# Patient Record
Sex: FEMALE | Race: BLACK OR AFRICAN AMERICAN | NOT HISPANIC OR LATINO | Employment: FULL TIME | RURAL
[De-identification: names, ages, dates, MRNs, and addresses within clinical notes are randomized per-mention and may not be internally consistent; named-entity substitution may affect disease eponyms.]

---

## 2017-05-09 ENCOUNTER — HISTORICAL (OUTPATIENT)
Dept: ADMINISTRATIVE | Facility: HOSPITAL | Age: 32
End: 2017-05-09

## 2017-05-11 LAB
LAB AP CLINICAL INFORMATION: NORMAL
LAB AP COMMENTS: NORMAL
LAB AP GENERAL CAT - HISTORICAL: NORMAL
LAB AP INTERPRETATION/RESULT - HISTORICAL: NEGATIVE
LAB AP SPECIMEN ADEQUACY - HISTORICAL: NORMAL
LAB AP SPECIMEN SUBMITTED - HISTORICAL: NORMAL

## 2017-11-21 ENCOUNTER — HISTORICAL (OUTPATIENT)
Dept: ADMINISTRATIVE | Facility: HOSPITAL | Age: 32
End: 2017-11-21

## 2017-11-24 LAB
LAB AP CLINICAL INFORMATION: NORMAL
LAB AP DIAGNOSIS - HISTORICAL: NORMAL
LAB AP GROSS PATHOLOGY - HISTORICAL: NORMAL
LAB AP SPECIMEN SUBMITTED - HISTORICAL: NORMAL

## 2018-01-04 ENCOUNTER — HISTORICAL (OUTPATIENT)
Dept: ADMINISTRATIVE | Facility: HOSPITAL | Age: 33
End: 2018-01-04

## 2018-01-08 LAB
LAB AP CLINICAL INFORMATION: NORMAL
LAB AP GENERAL CAT - HISTORICAL: NORMAL
LAB AP INTERPRETATION/RESULT - HISTORICAL: NEGATIVE
LAB AP SPECIMEN ADEQUACY - HISTORICAL: NORMAL
LAB AP SPECIMEN SUBMITTED - HISTORICAL: NORMAL

## 2019-06-25 ENCOUNTER — HISTORICAL (OUTPATIENT)
Dept: ADMINISTRATIVE | Facility: HOSPITAL | Age: 34
End: 2019-06-25

## 2021-05-05 ENCOUNTER — OFFICE VISIT (OUTPATIENT)
Dept: FAMILY MEDICINE | Facility: CLINIC | Age: 36
End: 2021-05-05
Payer: COMMERCIAL

## 2021-05-05 VITALS
TEMPERATURE: 99 F | SYSTOLIC BLOOD PRESSURE: 160 MMHG | OXYGEN SATURATION: 100 % | DIASTOLIC BLOOD PRESSURE: 90 MMHG | WEIGHT: 293 LBS | RESPIRATION RATE: 16 BRPM | HEIGHT: 66 IN | BODY MASS INDEX: 47.09 KG/M2 | HEART RATE: 66 BPM

## 2021-05-05 DIAGNOSIS — J01.00 ACUTE NON-RECURRENT MAXILLARY SINUSITIS: ICD-10-CM

## 2021-05-05 DIAGNOSIS — Z00.00 WELLNESS EXAMINATION: Primary | ICD-10-CM

## 2021-05-05 LAB
ALBUMIN SERPL BCP-MCNC: 3.6 G/DL (ref 3.5–5)
ALBUMIN/GLOB SERPL: 0.9 {RATIO}
ALP SERPL-CCNC: 104 U/L (ref 37–98)
ALT SERPL W P-5'-P-CCNC: 18 U/L (ref 13–56)
ANION GAP SERPL CALCULATED.3IONS-SCNC: 11 MMOL/L (ref 7–16)
AST SERPL W P-5'-P-CCNC: 9 U/L (ref 15–37)
BACTERIA #/AREA URNS HPF: ABNORMAL /HPF
BASOPHILS # BLD AUTO: 0.09 K/UL (ref 0–0.2)
BASOPHILS NFR BLD AUTO: 0.8 % (ref 0–1)
BILIRUB SERPL-MCNC: 0.4 MG/DL (ref 0–1.2)
BILIRUB UR QL STRIP: NEGATIVE
BUN SERPL-MCNC: 15 MG/DL (ref 7–18)
BUN/CREAT SERPL: 21 (ref 6–20)
CALCIUM SERPL-MCNC: 8.6 MG/DL (ref 8.5–10.1)
CHLORIDE SERPL-SCNC: 106 MMOL/L (ref 98–107)
CHOLEST SERPL-MCNC: 126 MG/DL (ref 0–200)
CHOLEST/HDLC SERPL: 2.9 {RATIO}
CLARITY UR: CLEAR
CO2 SERPL-SCNC: 27 MMOL/L (ref 21–32)
COLOR UR: YELLOW
CREAT SERPL-MCNC: 0.72 MG/DL (ref 0.55–1.02)
DIFFERENTIAL METHOD BLD: ABNORMAL
EOSINOPHIL # BLD AUTO: 0.14 K/UL (ref 0–0.5)
EOSINOPHIL NFR BLD AUTO: 1.2 % (ref 1–4)
ERYTHROCYTE [DISTWIDTH] IN BLOOD BY AUTOMATED COUNT: 17.5 % (ref 11.5–14.5)
GLOBULIN SER-MCNC: 4 G/DL (ref 2–4)
GLUCOSE SERPL-MCNC: 75 MG/DL (ref 74–106)
GLUCOSE UR STRIP-MCNC: NEGATIVE MG/DL
HCT VFR BLD AUTO: 34.1 % (ref 38–47)
HDLC SERPL-MCNC: 43 MG/DL (ref 40–60)
HGB BLD-MCNC: 10 G/DL (ref 12–16)
IMM GRANULOCYTES # BLD AUTO: 0.06 K/UL (ref 0–0.04)
IMM GRANULOCYTES NFR BLD: 0.5 % (ref 0–0.4)
KETONES UR STRIP-SCNC: NEGATIVE MG/DL
LDLC SERPL CALC-MCNC: 60 MG/DL
LDLC/HDLC SERPL: 1.4 {RATIO}
LEUKOCYTE ESTERASE UR QL STRIP: ABNORMAL
LYMPHOCYTES # BLD AUTO: 4.59 K/UL (ref 1–4.8)
LYMPHOCYTES NFR BLD AUTO: 40.2 % (ref 27–41)
MCH RBC QN AUTO: 22 PG (ref 27–31)
MCHC RBC AUTO-ENTMCNC: 29.3 G/DL (ref 32–36)
MCV RBC AUTO: 75.1 FL (ref 80–96)
MONOCYTES # BLD AUTO: 0.48 K/UL (ref 0–0.8)
MONOCYTES NFR BLD AUTO: 4.2 % (ref 2–6)
MPC BLD CALC-MCNC: 11.6 FL (ref 9.4–12.4)
MUCOUS THREADS #/AREA URNS HPF: ABNORMAL /HPF
NEUTROPHILS # BLD AUTO: 6.05 K/UL (ref 1.8–7.7)
NEUTROPHILS NFR BLD AUTO: 53.1 % (ref 53–65)
NITRITE UR QL STRIP: NEGATIVE
NONHDLC SERPL-MCNC: 83 MG/DL
NRBC # BLD AUTO: 0 X10E3/UL
NRBC, AUTO (.00): 0 %
NT-PROBNP SERPL-MCNC: 186 PG/ML (ref 1–125)
PH UR STRIP: 6 PH UNITS
PLATELET # BLD AUTO: 314 K/UL (ref 150–400)
POTASSIUM SERPL-SCNC: 3.8 MMOL/L (ref 3.5–5.1)
PROT SERPL-MCNC: 7.6 G/DL (ref 6.4–8.2)
PROT UR QL STRIP: NEGATIVE
RBC # BLD AUTO: 4.54 M/UL (ref 4.2–5.4)
RBC # UR STRIP: ABNORMAL /UL
RBC #/AREA URNS HPF: ABNORMAL /HPF
SODIUM SERPL-SCNC: 140 MMOL/L (ref 136–145)
SP GR UR STRIP: 1.02
SQUAMOUS #/AREA URNS LPF: ABNORMAL /LPF
T4 FREE SERPL-MCNC: 0.95 NG/DL (ref 0.76–1.46)
TRANS CELLS #/AREA URNS LPF: ABNORMAL /LPF
TRIGL SERPL-MCNC: 115 MG/DL (ref 35–150)
TSH SERPL DL<=0.005 MIU/L-ACNC: 2.15 UIU/ML (ref 0.36–3.74)
UROBILINOGEN UR STRIP-ACNC: 0.2 MG/DL
VLDLC SERPL-MCNC: 23 MG/DL
WBC # BLD AUTO: 11.41 K/UL (ref 4.5–11)
WBC #/AREA URNS HPF: ABNORMAL /HPF

## 2021-05-05 PROCEDURE — 99395 PR PREVENTIVE VISIT,EST,18-39: ICD-10-PCS | Mod: 25,,, | Performed by: FAMILY MEDICINE

## 2021-05-05 PROCEDURE — 84439 ASSAY OF FREE THYROXINE: CPT | Mod: ICN,,, | Performed by: CLINICAL MEDICAL LABORATORY

## 2021-05-05 PROCEDURE — 80050 COMPREHENSIVE METABOLIC PANEL: ICD-10-PCS | Mod: ICN,,, | Performed by: CLINICAL MEDICAL LABORATORY

## 2021-05-05 PROCEDURE — 81001 URINALYSIS AUTO W/SCOPE: CPT | Mod: ICN,,, | Performed by: CLINICAL MEDICAL LABORATORY

## 2021-05-05 PROCEDURE — 96372 THER/PROPH/DIAG INJ SC/IM: CPT | Mod: ,,, | Performed by: FAMILY MEDICINE

## 2021-05-05 PROCEDURE — 87077 CULTURE, URINE: ICD-10-PCS | Mod: ICN,,, | Performed by: CLINICAL MEDICAL LABORATORY

## 2021-05-05 PROCEDURE — 87086 URINE CULTURE/COLONY COUNT: CPT | Mod: ICN,,, | Performed by: CLINICAL MEDICAL LABORATORY

## 2021-05-05 PROCEDURE — 84439 T4, FREE: ICD-10-PCS | Mod: ICN,,, | Performed by: CLINICAL MEDICAL LABORATORY

## 2021-05-05 PROCEDURE — 87077 CULTURE AEROBIC IDENTIFY: CPT | Mod: ICN,,, | Performed by: CLINICAL MEDICAL LABORATORY

## 2021-05-05 PROCEDURE — 87086 CULTURE, URINE: ICD-10-PCS | Mod: ICN,,, | Performed by: CLINICAL MEDICAL LABORATORY

## 2021-05-05 PROCEDURE — 83880 ASSAY OF NATRIURETIC PEPTIDE: CPT | Mod: ICN,,, | Performed by: CLINICAL MEDICAL LABORATORY

## 2021-05-05 PROCEDURE — 80050 GENERAL HEALTH PANEL: CPT | Mod: ICN,,, | Performed by: CLINICAL MEDICAL LABORATORY

## 2021-05-05 PROCEDURE — 80061 LIPID PANEL: CPT | Mod: ICN,,, | Performed by: CLINICAL MEDICAL LABORATORY

## 2021-05-05 PROCEDURE — 82306 VITAMIN D: ICD-10-PCS | Mod: ICN,,, | Performed by: CLINICAL MEDICAL LABORATORY

## 2021-05-05 PROCEDURE — 82306 VITAMIN D 25 HYDROXY: CPT | Mod: ICN,,, | Performed by: CLINICAL MEDICAL LABORATORY

## 2021-05-05 PROCEDURE — 96372 PR INJECTION,THERAP/PROPH/DIAG2ST, IM OR SUBCUT: ICD-10-PCS | Mod: ,,, | Performed by: FAMILY MEDICINE

## 2021-05-05 PROCEDURE — 80061 LIPID PANEL: ICD-10-PCS | Mod: ICN,,, | Performed by: CLINICAL MEDICAL LABORATORY

## 2021-05-05 PROCEDURE — 83880 NT-PRO NATRIURETIC PEPTIDE: ICD-10-PCS | Mod: ICN,,, | Performed by: CLINICAL MEDICAL LABORATORY

## 2021-05-05 PROCEDURE — 81001 URINALYSIS, MICROSCOPIC: ICD-10-PCS | Mod: ICN,,, | Performed by: CLINICAL MEDICAL LABORATORY

## 2021-05-05 PROCEDURE — 99395 PREV VISIT EST AGE 18-39: CPT | Mod: 25,,, | Performed by: FAMILY MEDICINE

## 2021-05-05 RX ORDER — PHENTERMINE HYDROCHLORIDE 37.5 MG/1
37.5 TABLET ORAL DAILY
COMMUNITY
Start: 2021-01-28 | End: 2023-07-04

## 2021-05-05 RX ORDER — AMOXICILLIN AND CLAVULANATE POTASSIUM 875; 125 MG/1; MG/1
1 TABLET, FILM COATED ORAL 2 TIMES DAILY
Qty: 20 TABLET | Refills: 0 | Status: SHIPPED | OUTPATIENT
Start: 2021-05-05 | End: 2021-05-15

## 2021-05-05 RX ORDER — CEFTRIAXONE 1 G/1
1 INJECTION, POWDER, FOR SOLUTION INTRAMUSCULAR; INTRAVENOUS
Status: COMPLETED | OUTPATIENT
Start: 2021-05-05 | End: 2021-05-05

## 2021-05-05 RX ADMIN — CEFTRIAXONE 1 G: 1 INJECTION, POWDER, FOR SOLUTION INTRAMUSCULAR; INTRAVENOUS at 01:05

## 2021-05-06 LAB — 25(OH)D3 SERPL-MCNC: 17.4 NG/ML

## 2021-05-07 LAB — UA COMPLETE W REFLEX CULTURE PNL UR: ABNORMAL

## 2021-05-10 DIAGNOSIS — N39.0 URINARY TRACT INFECTION WITHOUT HEMATURIA, SITE UNSPECIFIED: Primary | ICD-10-CM

## 2021-05-10 DIAGNOSIS — E61.1 IRON DEFICIENCY: ICD-10-CM

## 2021-05-10 DIAGNOSIS — E55.9 VITAMIN D DEFICIENCY: ICD-10-CM

## 2021-05-10 RX ORDER — FERROUS SULFATE 325(65) MG
325 TABLET, DELAYED RELEASE (ENTERIC COATED) ORAL 2 TIMES DAILY
Qty: 60 TABLET | Refills: 0 | Status: SHIPPED | OUTPATIENT
Start: 2021-05-10 | End: 2021-06-09

## 2021-05-10 RX ORDER — NITROFURANTOIN 25; 75 MG/1; MG/1
100 CAPSULE ORAL 2 TIMES DAILY
Qty: 14 CAPSULE | Refills: 0 | Status: SHIPPED | OUTPATIENT
Start: 2021-05-10 | End: 2021-05-17

## 2021-05-10 RX ORDER — CHOLECALCIFEROL (VITAMIN D3) 1250 MCG
1 TABLET ORAL WEEKLY
Qty: 12 TABLET | Refills: 0 | Status: SHIPPED | OUTPATIENT
Start: 2021-05-10 | End: 2021-07-27

## 2021-05-11 DIAGNOSIS — I10 HYPERTENSION, UNSPECIFIED TYPE: Primary | ICD-10-CM

## 2021-06-02 ENCOUNTER — OFFICE VISIT (OUTPATIENT)
Dept: CARDIOLOGY | Facility: CLINIC | Age: 36
End: 2021-06-02
Payer: COMMERCIAL

## 2021-06-02 VITALS
HEIGHT: 66 IN | BODY MASS INDEX: 47.09 KG/M2 | WEIGHT: 293 LBS | SYSTOLIC BLOOD PRESSURE: 126 MMHG | DIASTOLIC BLOOD PRESSURE: 90 MMHG

## 2021-06-02 DIAGNOSIS — I10 HYPERTENSION, UNSPECIFIED TYPE: ICD-10-CM

## 2021-06-02 DIAGNOSIS — R94.31 ABNORMAL ELECTROCARDIOGRAM (ECG) (EKG): ICD-10-CM

## 2021-06-02 DIAGNOSIS — E66.01 MORBID OBESITY: ICD-10-CM

## 2021-06-02 DIAGNOSIS — R60.0 BILATERAL LOWER EXTREMITY EDEMA: Primary | ICD-10-CM

## 2021-06-02 PROCEDURE — 93010 ELECTROCARDIOGRAM REPORT: CPT | Mod: S$PBB,,, | Performed by: INTERNAL MEDICINE

## 2021-06-02 PROCEDURE — 93005 ELECTROCARDIOGRAM TRACING: CPT | Mod: PBBFAC | Performed by: INTERNAL MEDICINE

## 2021-06-02 PROCEDURE — 99214 OFFICE O/P EST MOD 30 MIN: CPT | Mod: PBBFAC,25 | Performed by: INTERNAL MEDICINE

## 2021-06-02 PROCEDURE — 93010 EKG 12-LEAD: ICD-10-PCS | Mod: S$PBB,,, | Performed by: INTERNAL MEDICINE

## 2021-06-02 PROCEDURE — 99204 OFFICE O/P NEW MOD 45 MIN: CPT | Mod: S$PBB,,, | Performed by: INTERNAL MEDICINE

## 2021-06-02 PROCEDURE — 99204 PR OFFICE/OUTPT VISIT, NEW, LEVL IV, 45-59 MIN: ICD-10-PCS | Mod: S$PBB,,, | Performed by: INTERNAL MEDICINE

## 2021-06-18 ENCOUNTER — HOSPITAL ENCOUNTER (OUTPATIENT)
Dept: CARDIOLOGY | Facility: HOSPITAL | Age: 36
Discharge: HOME | End: 2021-06-18
Attending: INTERNAL MEDICINE
Payer: COMMERCIAL

## 2021-06-18 ENCOUNTER — HOSPITAL ENCOUNTER (OUTPATIENT)
Dept: RADIOLOGY | Facility: HOSPITAL | Age: 36
Discharge: HOME | End: 2021-06-18
Attending: INTERNAL MEDICINE
Payer: COMMERCIAL

## 2021-06-18 DIAGNOSIS — R94.31 NONSPECIFIC ABNORMAL ELECTROCARDIOGRAM (ECG) (EKG): Primary | ICD-10-CM

## 2021-06-18 DIAGNOSIS — R94.31 ABNORMAL ELECTROCARDIOGRAM (ECG) (EKG): ICD-10-CM

## 2021-06-18 DIAGNOSIS — R07.9 CHEST PAIN, UNSPECIFIED TYPE: ICD-10-CM

## 2021-06-18 PROCEDURE — 93306 TTE W/DOPPLER COMPLETE: CPT | Mod: 26,,, | Performed by: INTERNAL MEDICINE

## 2021-06-18 PROCEDURE — 93306 ECHO (CUPID ONLY): ICD-10-PCS | Mod: 26,,, | Performed by: INTERNAL MEDICINE

## 2021-06-18 PROCEDURE — 93306 TTE W/DOPPLER COMPLETE: CPT

## 2021-06-20 LAB
AORTIC VALVE CUSP SEPERATION: 2.17 CM
AV INDEX (PROSTH): 0.64
AV VALVE AREA: 2.43 CM2
CV ECHO LV RWT: 0.53 CM
DOP CALC AO VTI: 30.07 CM
DOP CALC LVOT AREA: 3.8 CM2
DOP CALC LVOT DIAMETER: 2.2 CM
DOP CALC LVOT STROKE VOLUME: 73.14 CM3
DOP CALC MV VTI: 28.96 CM
DOP CALCLVOT PEAK VEL VTI: 19.25 CM
E WAVE DECELERATION TIME: 171 MSEC
ECHO LV POSTERIOR WALL: 1.33 CM (ref 0.6–1.1)
EJECTION FRACTION: 55 %
FRACTIONAL SHORTENING: 28 % (ref 28–44)
HR MV ECHO: 100 BPM
INTERVENTRICULAR SEPTUM: 1.16 CM (ref 0.6–1.1)
IVC DIAMETER: 1.6 CM
LEFT INTERNAL DIMENSION IN SYSTOLE: 3.57 CM (ref 2.1–4)
LEFT VENTRICULAR INTERNAL DIMENSION IN DIASTOLE: 4.98 CM (ref 3.5–6)
LEFT VENTRICULAR MASS: 244.65 G
MV MEAN GRADIENT: 3 MMHG
MV PEAK E VEL: 1.08 M/S
MV PEAK GRADIENT: 6 MMHG
MV STENOSIS PRESSURE HALF TIME: 71 MS
MV VALVE AREA BY CONTINUITY EQUATION: 2.53 CM2
MV VALVE AREA P 1/2 METHOD: 3.1 CM2

## 2021-06-27 PROBLEM — E66.01 MORBID OBESITY: Status: ACTIVE | Noted: 2021-06-27

## 2021-06-27 PROBLEM — R94.31 ABNORMAL ELECTROCARDIOGRAM (ECG) (EKG): Status: ACTIVE | Noted: 2021-06-27

## 2021-06-27 PROBLEM — I10 HYPERTENSION: Status: ACTIVE | Noted: 2021-06-27

## 2021-06-27 PROBLEM — R60.0 BILATERAL LOWER EXTREMITY EDEMA: Status: ACTIVE | Noted: 2021-06-27

## 2021-07-07 ENCOUNTER — HOSPITAL ENCOUNTER (OUTPATIENT)
Dept: CARDIOLOGY | Facility: HOSPITAL | Age: 36
Discharge: HOME | End: 2021-07-07
Attending: INTERNAL MEDICINE
Payer: COMMERCIAL

## 2021-07-07 VITALS
HEART RATE: 74 BPM | BODY MASS INDEX: 46.61 KG/M2 | SYSTOLIC BLOOD PRESSURE: 153 MMHG | DIASTOLIC BLOOD PRESSURE: 75 MMHG | HEIGHT: 66 IN | WEIGHT: 290 LBS

## 2021-07-07 DIAGNOSIS — R94.31 NONSPECIFIC ABNORMAL ELECTROCARDIOGRAM (ECG) (EKG): ICD-10-CM

## 2021-07-07 LAB
CV STRESS BASE HR: 74 BPM
DIASTOLIC BLOOD PRESSURE: 78 MMHG
OHS CV CPX 1 MINUTE RECOVERY HEART RATE: 101 BPM
OHS CV CPX 85 PERCENT MAX PREDICTED HEART RATE MALE: 147
OHS CV CPX ESTIMATED METS: 4
OHS CV CPX MAX PREDICTED HEART RATE: 173
OHS CV CPX PATIENT IS FEMALE: 1
OHS CV CPX PATIENT IS MALE: 0
OHS CV CPX PEAK DIASTOLIC BLOOD PRESSURE: 104 MMHG
OHS CV CPX PEAK HEAR RATE: 141 BPM
OHS CV CPX PEAK RATE PRESSURE PRODUCT: NORMAL
OHS CV CPX PEAK SYSTOLIC BLOOD PRESSURE: 216 MMHG
OHS CV CPX PERCENT MAX PREDICTED HEART RATE ACHIEVED: 82
OHS CV CPX RATE PRESSURE PRODUCT PRESENTING: NORMAL
STRESS ECHO POST EXERCISE DUR MIN: 4 MINUTES
STRESS ECHO POST EXERCISE DUR SEC: 44 SECONDS
SYSTOLIC BLOOD PRESSURE: 153 MMHG

## 2021-07-07 PROCEDURE — 93016 CV STRESS TEST SUPVJ ONLY: CPT | Mod: ,,, | Performed by: NURSE PRACTITIONER

## 2021-07-07 PROCEDURE — 93018 EXERCISE STRESS - EKG (CUPID ONLY): ICD-10-PCS | Mod: ,,, | Performed by: INTERNAL MEDICINE

## 2021-07-07 PROCEDURE — 93018 CV STRESS TEST I&R ONLY: CPT | Mod: ,,, | Performed by: INTERNAL MEDICINE

## 2021-07-07 PROCEDURE — 93016 EXERCISE STRESS - EKG (CUPID ONLY): ICD-10-PCS | Mod: ,,, | Performed by: NURSE PRACTITIONER

## 2021-07-07 PROCEDURE — 93017 CV STRESS TEST TRACING ONLY: CPT

## 2021-07-20 ENCOUNTER — DOCUMENTATION ONLY (OUTPATIENT)
Dept: CARDIOLOGY | Facility: CLINIC | Age: 36
End: 2021-07-20

## 2021-07-20 ENCOUNTER — OFFICE VISIT (OUTPATIENT)
Dept: CARDIOLOGY | Facility: CLINIC | Age: 36
End: 2021-07-20
Payer: COMMERCIAL

## 2021-07-20 VITALS
OXYGEN SATURATION: 96 % | BODY MASS INDEX: 46.61 KG/M2 | SYSTOLIC BLOOD PRESSURE: 124 MMHG | HEIGHT: 66 IN | HEART RATE: 75 BPM | DIASTOLIC BLOOD PRESSURE: 80 MMHG | WEIGHT: 290 LBS

## 2021-07-20 DIAGNOSIS — R60.0 BILATERAL LOWER EXTREMITY EDEMA: Primary | ICD-10-CM

## 2021-07-20 DIAGNOSIS — I10 ESSENTIAL HYPERTENSION: ICD-10-CM

## 2021-07-20 DIAGNOSIS — E66.01 MORBID OBESITY: ICD-10-CM

## 2021-07-20 PROCEDURE — 3008F BODY MASS INDEX DOCD: CPT | Mod: ,,, | Performed by: INTERNAL MEDICINE

## 2021-07-20 PROCEDURE — 1159F MED LIST DOCD IN RCRD: CPT | Mod: ,,, | Performed by: INTERNAL MEDICINE

## 2021-07-20 PROCEDURE — 3079F DIAST BP 80-89 MM HG: CPT | Mod: ,,, | Performed by: INTERNAL MEDICINE

## 2021-07-20 PROCEDURE — 99214 OFFICE O/P EST MOD 30 MIN: CPT | Mod: S$PBB,,, | Performed by: INTERNAL MEDICINE

## 2021-07-20 PROCEDURE — 1160F PR REVIEW ALL MEDS BY PRESCRIBER/CLIN PHARMACIST DOCUMENTED: ICD-10-PCS | Mod: ,,, | Performed by: INTERNAL MEDICINE

## 2021-07-20 PROCEDURE — 3008F PR BODY MASS INDEX (BMI) DOCUMENTED: ICD-10-PCS | Mod: ,,, | Performed by: INTERNAL MEDICINE

## 2021-07-20 PROCEDURE — 99214 PR OFFICE/OUTPT VISIT, EST, LEVL IV, 30-39 MIN: ICD-10-PCS | Mod: S$PBB,,, | Performed by: INTERNAL MEDICINE

## 2021-07-20 PROCEDURE — 3074F SYST BP LT 130 MM HG: CPT | Mod: ,,, | Performed by: INTERNAL MEDICINE

## 2021-07-20 PROCEDURE — 1160F RVW MEDS BY RX/DR IN RCRD: CPT | Mod: ,,, | Performed by: INTERNAL MEDICINE

## 2021-07-20 PROCEDURE — 99214 OFFICE O/P EST MOD 30 MIN: CPT | Mod: PBBFAC | Performed by: INTERNAL MEDICINE

## 2021-07-20 PROCEDURE — 1159F PR MEDICATION LIST DOCUMENTED IN MEDICAL RECORD: ICD-10-PCS | Mod: ,,, | Performed by: INTERNAL MEDICINE

## 2021-07-20 PROCEDURE — 3079F PR MOST RECENT DIASTOLIC BLOOD PRESSURE 80-89 MM HG: ICD-10-PCS | Mod: ,,, | Performed by: INTERNAL MEDICINE

## 2021-07-20 PROCEDURE — 3074F PR MOST RECENT SYSTOLIC BLOOD PRESSURE < 130 MM HG: ICD-10-PCS | Mod: ,,, | Performed by: INTERNAL MEDICINE

## 2021-07-20 RX ORDER — HYDROCHLOROTHIAZIDE 25 MG/1
25 TABLET ORAL DAILY
COMMUNITY
End: 2023-07-04

## 2021-07-26 ENCOUNTER — OFFICE VISIT (OUTPATIENT)
Dept: OBSTETRICS AND GYNECOLOGY | Facility: CLINIC | Age: 36
End: 2021-07-26
Payer: COMMERCIAL

## 2021-07-26 VITALS
BODY MASS INDEX: 47.09 KG/M2 | DIASTOLIC BLOOD PRESSURE: 80 MMHG | SYSTOLIC BLOOD PRESSURE: 124 MMHG | HEIGHT: 66 IN | RESPIRATION RATE: 16 BRPM | WEIGHT: 293 LBS

## 2021-07-26 DIAGNOSIS — Z01.419 ENCOUNTER FOR ANNUAL ROUTINE GYNECOLOGICAL EXAMINATION: Primary | ICD-10-CM

## 2021-07-26 DIAGNOSIS — Z12.4 SCREENING FOR MALIGNANT NEOPLASM OF CERVIX: ICD-10-CM

## 2021-07-26 DIAGNOSIS — Z30.46 SURVEILLANCE OF PREVIOUSLY PRESCRIBED IMPLANTABLE SUBDERMAL CONTRACEPTIVE: ICD-10-CM

## 2021-07-26 PROCEDURE — 3074F SYST BP LT 130 MM HG: CPT | Mod: ,,, | Performed by: OBSTETRICS & GYNECOLOGY

## 2021-07-26 PROCEDURE — 99395 PREV VISIT EST AGE 18-39: CPT | Mod: S$PBB,,, | Performed by: OBSTETRICS & GYNECOLOGY

## 2021-07-26 PROCEDURE — 1160F RVW MEDS BY RX/DR IN RCRD: CPT | Mod: ,,, | Performed by: OBSTETRICS & GYNECOLOGY

## 2021-07-26 PROCEDURE — 1159F PR MEDICATION LIST DOCUMENTED IN MEDICAL RECORD: ICD-10-PCS | Mod: ,,, | Performed by: OBSTETRICS & GYNECOLOGY

## 2021-07-26 PROCEDURE — 3008F BODY MASS INDEX DOCD: CPT | Mod: ,,, | Performed by: OBSTETRICS & GYNECOLOGY

## 2021-07-26 PROCEDURE — 88142 CYTOPATH C/V THIN LAYER: CPT | Mod: GCY | Performed by: OBSTETRICS & GYNECOLOGY

## 2021-07-26 PROCEDURE — 1159F MED LIST DOCD IN RCRD: CPT | Mod: ,,, | Performed by: OBSTETRICS & GYNECOLOGY

## 2021-07-26 PROCEDURE — 3074F PR MOST RECENT SYSTOLIC BLOOD PRESSURE < 130 MM HG: ICD-10-PCS | Mod: ,,, | Performed by: OBSTETRICS & GYNECOLOGY

## 2021-07-26 PROCEDURE — 3008F PR BODY MASS INDEX (BMI) DOCUMENTED: ICD-10-PCS | Mod: ,,, | Performed by: OBSTETRICS & GYNECOLOGY

## 2021-07-26 PROCEDURE — 1160F PR REVIEW ALL MEDS BY PRESCRIBER/CLIN PHARMACIST DOCUMENTED: ICD-10-PCS | Mod: ,,, | Performed by: OBSTETRICS & GYNECOLOGY

## 2021-07-26 PROCEDURE — 3079F DIAST BP 80-89 MM HG: CPT | Mod: ,,, | Performed by: OBSTETRICS & GYNECOLOGY

## 2021-07-26 PROCEDURE — 3079F PR MOST RECENT DIASTOLIC BLOOD PRESSURE 80-89 MM HG: ICD-10-PCS | Mod: ,,, | Performed by: OBSTETRICS & GYNECOLOGY

## 2021-07-26 PROCEDURE — 99213 OFFICE O/P EST LOW 20 MIN: CPT | Mod: PBBFAC | Performed by: OBSTETRICS & GYNECOLOGY

## 2021-07-26 PROCEDURE — 99395 PR PREVENTIVE VISIT,EST,18-39: ICD-10-PCS | Mod: S$PBB,,, | Performed by: OBSTETRICS & GYNECOLOGY

## 2021-07-26 RX ORDER — FERROUS SULFATE 325(65) MG
325 TABLET ORAL DAILY
COMMUNITY
End: 2023-07-04

## 2021-07-28 LAB
GH SERPL-MCNC: NORMAL NG/ML
INSULIN SERPL-ACNC: NORMAL U[IU]/ML
LAB AP CLINICAL INFORMATION: NORMAL
LAB AP GYN INTERPRETATION: NEGATIVE
LAB AP PAP DISCLAIMER COMMENTS: NORMAL
RENIN PLAS-CCNC: NORMAL NG/ML/H

## 2021-09-27 ENCOUNTER — PROCEDURE VISIT (OUTPATIENT)
Dept: OBSTETRICS AND GYNECOLOGY | Facility: CLINIC | Age: 36
End: 2021-09-27
Payer: COMMERCIAL

## 2021-09-27 VITALS
BODY MASS INDEX: 47.09 KG/M2 | HEIGHT: 66 IN | RESPIRATION RATE: 16 BRPM | SYSTOLIC BLOOD PRESSURE: 122 MMHG | WEIGHT: 293 LBS | DIASTOLIC BLOOD PRESSURE: 64 MMHG

## 2021-09-27 DIAGNOSIS — Z30.46 SURVEILLANCE OF PREVIOUSLY PRESCRIBED IMPLANTABLE SUBDERMAL CONTRACEPTIVE: ICD-10-CM

## 2021-09-27 DIAGNOSIS — Z30.017 INSERTION OF NEXPLANON: Primary | ICD-10-CM

## 2021-09-27 LAB
B-HCG UR QL: NEGATIVE
CTP QC/QA: YES

## 2021-09-27 PROCEDURE — 11981 INSERTION DRUG DLVR IMPLANT: CPT | Mod: S$PBB,,, | Performed by: OBSTETRICS & GYNECOLOGY

## 2021-09-27 PROCEDURE — 11981 PR INSERT, DRUG DELIVERY IMPLANT, BIORESORB/BIODEGR/NON-BIODEGR: ICD-10-PCS | Mod: S$PBB,,, | Performed by: OBSTETRICS & GYNECOLOGY

## 2021-09-27 PROCEDURE — 11981 INSERTION DRUG DLVR IMPLANT: CPT | Mod: PBBFAC | Performed by: OBSTETRICS & GYNECOLOGY

## 2021-09-27 PROCEDURE — 81025 URINE PREGNANCY TEST: CPT | Mod: PBBFAC | Performed by: OBSTETRICS & GYNECOLOGY

## 2021-09-27 RX ADMIN — ETONOGESTREL 68 MG: 68 IMPLANT SUBCUTANEOUS at 05:09

## 2022-01-24 NOTE — PROGRESS NOTES
Cardiology Clinic Note:    PCP: Abiodun Diaz DO    REFERRING PHYSICIAN: Abiodun Diaz DO    CHIEF COMPLAINT:   Chief Complaint   Patient presents with    Hypertension    Edema        HISTORY OF PRESENT ILLNESS:  Dimple Reinoso is a 36 y.o. female who presents for evaluation of lower extremity edema        Patient presents for evaluation of lower extremity edema which she states is still present and having some hand swelling.  She completed her course of Lasix.  Has decreased salt intake and fast food.    Pt denies chest pain, tightness, pressure, palpitations or squeezing.   Denies SOB.  Exercise includes walking at work up to 7000 steps without symptoms of chest pain, pressure or shortness of breath.  Patient states she has tried weight loss programs but has not been consistent.     Review of Systems   Constitutional: Negative for diaphoresis, malaise/fatigue, night sweats and weight gain.   HENT: Negative for congestion, ear pain, hearing loss, nosebleeds and sore throat.    Eyes: Negative for blurred vision, double vision, pain, photophobia and visual disturbance.   Cardiovascular: Positive for leg swelling. Negative for chest pain, claudication, dyspnea on exertion, irregular heartbeat, near-syncope, orthopnea, palpitations and syncope.   Respiratory: Negative for cough, shortness of breath, sleep disturbances due to breathing, snoring and wheezing.    Endocrine: Negative for cold intolerance, heat intolerance, polydipsia, polyphagia and polyuria.   Hematologic/Lymphatic: Negative for bleeding problem. Does not bruise/bleed easily.   Skin: Negative for dry skin, flushing, itching, rash and skin cancer.   Musculoskeletal: Negative for arthritis, back pain, falls, joint pain, muscle cramps, muscle weakness and myalgias.   Gastrointestinal: Negative for abdominal pain, change in bowel habit, constipation, diarrhea, dysphagia, heartburn, nausea and vomiting.   Genitourinary: Negative for bladder  "incontinence, dysuria, flank pain, frequency and nocturia.   Neurological: Negative for dizziness, focal weakness, headaches, light-headedness, loss of balance, numbness, paresthesias and seizures.   Psychiatric/Behavioral: Negative for depression, memory loss and substance abuse. The patient is not nervous/anxious.    Allergic/Immunologic: Negative for environmental allergies.          PAST MEDICAL HISTORY:  Past Medical History:   Diagnosis Date    Hypertension        PAST SURGICAL HISTORY:  Past Surgical History:   Procedure Laterality Date     SECTION  2017    CHOLECYSTECTOMY  2004    REDUCTION OF BOTH BREASTS Bilateral        SOCIAL HISTORY:  Social History     Socioeconomic History    Marital status:    Tobacco Use    Smoking status: Never Smoker    Smokeless tobacco: Never Used   Substance and Sexual Activity    Alcohol use: Yes     Comment: glass of wine maybe twice a month     Drug use: Never    Sexual activity: Yes     Partners: Male     Birth control/protection: None       FAMILY HISTORY:  Family History   Problem Relation Age of Onset    Breast cancer Maternal Grandmother     Diabetes Maternal Grandmother        ALLERGIES:  Allergies as of 2022    (No Known Allergies)         MEDICATIONS:  Current Outpatient Medications on File Prior to Visit   Medication Sig Dispense Refill    hydroCHLOROthiazide (HYDRODIURIL) 25 MG tablet Take 25 mg by mouth once daily.      ferrous sulfate (FEOSOL) 325 mg (65 mg iron) Tab tablet Take 325 mg by mouth once daily.      phentermine (ADIPEX-P) 37.5 mg tablet Take 37.5 mg by mouth once daily.       No current facility-administered medications on file prior to visit.          PHYSICAL EXAM:  Blood pressure (!) 140/92, pulse 76, height 5' 6" (1.676 m), weight 135.2 kg (298 lb), SpO2 98 %.  Wt Readings from Last 3 Encounters:   22 135.2 kg (298 lb)   21 (!) 137.2 kg (302 lb 8 oz)   21 (!) 138 kg (304 lb 2 oz)      Body " mass index is 48.1 kg/m².    Physical Exam  Vitals and nursing note reviewed.   Constitutional:       Appearance: Normal appearance. She is obese.   HENT:      Head: Normocephalic and atraumatic.      Right Ear: External ear normal.      Left Ear: External ear normal.   Eyes:      General: No scleral icterus.        Right eye: No discharge.         Left eye: No discharge.      Extraocular Movements: Extraocular movements intact.      Conjunctiva/sclera: Conjunctivae normal.      Pupils: Pupils are equal, round, and reactive to light.   Cardiovascular:      Rate and Rhythm: Normal rate and regular rhythm.      Pulses: Normal pulses.      Heart sounds: Normal heart sounds. No murmur heard.  No friction rub. No gallop.    Pulmonary:      Effort: Pulmonary effort is normal.      Breath sounds: Normal breath sounds. No wheezing, rhonchi or rales.   Chest:      Chest wall: No tenderness.   Abdominal:      General: Abdomen is flat. Bowel sounds are normal. There is no distension.      Palpations: Abdomen is soft.      Tenderness: There is no abdominal tenderness. There is no guarding or rebound.   Musculoskeletal:         General: No swelling or tenderness. Normal range of motion.      Cervical back: Normal range of motion and neck supple.   Skin:     General: Skin is warm and dry.      Findings: No erythema or rash.   Neurological:      General: No focal deficit present.      Mental Status: She is alert and oriented to person, place, and time.      Cranial Nerves: No cranial nerve deficit.      Motor: No weakness.      Gait: Gait normal.   Psychiatric:         Mood and Affect: Mood normal.         Behavior: Behavior normal.         Thought Content: Thought content normal.         Judgment: Judgment normal.          LABS REVIEWED:  Lab Results   Component Value Date    WBC 11.41 (H) 05/05/2021    RBC 4.54 05/05/2021    HGB 10.0 (L) 05/05/2021    HCT 34.1 (L) 05/05/2021    MCV 75.1 (L) 05/05/2021    MCH 22.0 (L) 05/05/2021     MCHC 29.3 (L) 05/05/2021    RDW 17.5 (H) 05/05/2021     05/05/2021    MPV 11.6 05/05/2021    NRBC 0.0 05/05/2021     Lab Results   Component Value Date     05/05/2021    K 3.8 05/05/2021     05/05/2021    CO2 27 05/05/2021    BUN 15 05/05/2021     Lab Results   Component Value Date    AST 9 (L) 05/05/2021    ALT 18 05/05/2021     Lab Results   Component Value Date    GLU 75 05/05/2021     Lab Results   Component Value Date    CHOL 126 05/05/2021    HDL 43 05/05/2021    TRIG 115 05/05/2021    CHOLHDL 2.9 05/05/2021       CARDIAC STUDIES REVIEWED:  EKG 1/2622 - sinus arrhythmia   Echo 6/18/21  EF 55%, Mild left atrial enlargement.  Mild-to-moderate mitral regurgitation.  Stress test normal    OTHER IMAGING STUDIES REVIEWED:    ASSESSMENT:   Other chest pain  -     EKG 12-lead; Future; Expected date: 01/26/2022      PLAN:  1.  Bilateral lower extremity edema.        Echo 6/18/21  EF 55%.  Stress test normal  2.  Hypertension, controlled.  Monitor at home.   3.  Morbid obesity, Healthy lifestyle, eliminate seasonings, decreased salt and fast food.  Walk 10,000 steps, exercise 2 days weekly, keep food log.  Consider bariatric surg, pt not interested. Pt started Weight Watchers last week.      Follow up with PCP in 2 mo, Dr. Saadia Gurrola  Follow up in clinic in 6 mo.

## 2022-01-26 ENCOUNTER — OFFICE VISIT (OUTPATIENT)
Dept: CARDIOLOGY | Facility: CLINIC | Age: 37
End: 2022-01-26
Payer: COMMERCIAL

## 2022-01-26 VITALS
WEIGHT: 293 LBS | HEART RATE: 76 BPM | SYSTOLIC BLOOD PRESSURE: 140 MMHG | OXYGEN SATURATION: 98 % | DIASTOLIC BLOOD PRESSURE: 92 MMHG | HEIGHT: 66 IN | BODY MASS INDEX: 47.09 KG/M2

## 2022-01-26 DIAGNOSIS — E66.01 MORBID OBESITY: ICD-10-CM

## 2022-01-26 DIAGNOSIS — I10 ESSENTIAL HYPERTENSION: ICD-10-CM

## 2022-01-26 DIAGNOSIS — R60.0 BILATERAL LOWER EXTREMITY EDEMA: ICD-10-CM

## 2022-01-26 DIAGNOSIS — R07.89 OTHER CHEST PAIN: Primary | ICD-10-CM

## 2022-01-26 PROCEDURE — 1159F PR MEDICATION LIST DOCUMENTED IN MEDICAL RECORD: ICD-10-PCS | Mod: ,,, | Performed by: INTERNAL MEDICINE

## 2022-01-26 PROCEDURE — 1160F PR REVIEW ALL MEDS BY PRESCRIBER/CLIN PHARMACIST DOCUMENTED: ICD-10-PCS | Mod: ,,, | Performed by: INTERNAL MEDICINE

## 2022-01-26 PROCEDURE — 3077F PR MOST RECENT SYSTOLIC BLOOD PRESSURE >= 140 MM HG: ICD-10-PCS | Mod: ,,, | Performed by: INTERNAL MEDICINE

## 2022-01-26 PROCEDURE — 1159F MED LIST DOCD IN RCRD: CPT | Mod: ,,, | Performed by: INTERNAL MEDICINE

## 2022-01-26 PROCEDURE — 99214 PR OFFICE/OUTPT VISIT, EST, LEVL IV, 30-39 MIN: ICD-10-PCS | Mod: S$PBB,,, | Performed by: INTERNAL MEDICINE

## 2022-01-26 PROCEDURE — 93005 ELECTROCARDIOGRAM TRACING: CPT | Mod: PBBFAC | Performed by: INTERNAL MEDICINE

## 2022-01-26 PROCEDURE — 99214 OFFICE O/P EST MOD 30 MIN: CPT | Mod: PBBFAC | Performed by: INTERNAL MEDICINE

## 2022-01-26 PROCEDURE — 99214 OFFICE O/P EST MOD 30 MIN: CPT | Mod: S$PBB,,, | Performed by: INTERNAL MEDICINE

## 2022-01-26 PROCEDURE — 3080F PR MOST RECENT DIASTOLIC BLOOD PRESSURE >= 90 MM HG: ICD-10-PCS | Mod: ,,, | Performed by: INTERNAL MEDICINE

## 2022-01-26 PROCEDURE — 93010 ELECTROCARDIOGRAM REPORT: CPT | Mod: S$PBB,,, | Performed by: INTERNAL MEDICINE

## 2022-01-26 PROCEDURE — 3080F DIAST BP >= 90 MM HG: CPT | Mod: ,,, | Performed by: INTERNAL MEDICINE

## 2022-01-26 PROCEDURE — 1160F RVW MEDS BY RX/DR IN RCRD: CPT | Mod: ,,, | Performed by: INTERNAL MEDICINE

## 2022-01-26 PROCEDURE — 3008F PR BODY MASS INDEX (BMI) DOCUMENTED: ICD-10-PCS | Mod: ,,, | Performed by: INTERNAL MEDICINE

## 2022-01-26 PROCEDURE — 3077F SYST BP >= 140 MM HG: CPT | Mod: ,,, | Performed by: INTERNAL MEDICINE

## 2022-01-26 PROCEDURE — 93010 EKG 12-LEAD: ICD-10-PCS | Mod: S$PBB,,, | Performed by: INTERNAL MEDICINE

## 2022-01-26 PROCEDURE — 3008F BODY MASS INDEX DOCD: CPT | Mod: ,,, | Performed by: INTERNAL MEDICINE

## 2022-01-26 NOTE — PATIENT INSTRUCTIONS
Healthy lifestyle, eliminate seasonings, decreased salt and fast food.  Walk 10,000 steps, exercise 2 days weekly, keep food log.

## 2022-02-21 ENCOUNTER — OFFICE VISIT (OUTPATIENT)
Dept: INTERNAL MEDICINE | Facility: CLINIC | Age: 37
End: 2022-02-21
Payer: COMMERCIAL

## 2022-02-21 VITALS
HEIGHT: 66 IN | BODY MASS INDEX: 47.09 KG/M2 | WEIGHT: 293 LBS | HEART RATE: 78 BPM | OXYGEN SATURATION: 99 % | DIASTOLIC BLOOD PRESSURE: 90 MMHG | SYSTOLIC BLOOD PRESSURE: 140 MMHG

## 2022-02-21 DIAGNOSIS — Z76.89 ENCOUNTER TO ESTABLISH CARE WITH NEW DOCTOR: Primary | ICD-10-CM

## 2022-02-21 DIAGNOSIS — R03.0 ELEVATED BP WITHOUT DIAGNOSIS OF HYPERTENSION: ICD-10-CM

## 2022-02-21 DIAGNOSIS — R51.9 SINUS HEADACHE: ICD-10-CM

## 2022-02-21 DIAGNOSIS — Z86.2 HISTORY OF IRON DEFICIENCY ANEMIA: ICD-10-CM

## 2022-02-21 DIAGNOSIS — R60.0 BILATERAL LOWER EXTREMITY EDEMA: ICD-10-CM

## 2022-02-21 PROCEDURE — 1159F MED LIST DOCD IN RCRD: CPT | Mod: ,,, | Performed by: INTERNAL MEDICINE

## 2022-02-21 PROCEDURE — 99204 OFFICE O/P NEW MOD 45 MIN: CPT | Mod: S$PBB,,, | Performed by: INTERNAL MEDICINE

## 2022-02-21 PROCEDURE — 99213 OFFICE O/P EST LOW 20 MIN: CPT | Mod: PBBFAC | Performed by: INTERNAL MEDICINE

## 2022-02-21 PROCEDURE — 3008F PR BODY MASS INDEX (BMI) DOCUMENTED: ICD-10-PCS | Mod: ,,, | Performed by: INTERNAL MEDICINE

## 2022-02-21 PROCEDURE — 3080F DIAST BP >= 90 MM HG: CPT | Mod: ,,, | Performed by: INTERNAL MEDICINE

## 2022-02-21 PROCEDURE — 3008F BODY MASS INDEX DOCD: CPT | Mod: ,,, | Performed by: INTERNAL MEDICINE

## 2022-02-21 PROCEDURE — 99204 PR OFFICE/OUTPT VISIT, NEW, LEVL IV, 45-59 MIN: ICD-10-PCS | Mod: S$PBB,,, | Performed by: INTERNAL MEDICINE

## 2022-02-21 PROCEDURE — 1159F PR MEDICATION LIST DOCUMENTED IN MEDICAL RECORD: ICD-10-PCS | Mod: ,,, | Performed by: INTERNAL MEDICINE

## 2022-02-21 PROCEDURE — 3077F PR MOST RECENT SYSTOLIC BLOOD PRESSURE >= 140 MM HG: ICD-10-PCS | Mod: ,,, | Performed by: INTERNAL MEDICINE

## 2022-02-21 PROCEDURE — 3080F PR MOST RECENT DIASTOLIC BLOOD PRESSURE >= 90 MM HG: ICD-10-PCS | Mod: ,,, | Performed by: INTERNAL MEDICINE

## 2022-02-21 PROCEDURE — 3077F SYST BP >= 140 MM HG: CPT | Mod: ,,, | Performed by: INTERNAL MEDICINE

## 2022-02-21 RX ORDER — MOMETASONE FUROATE 50 UG/1
2 SPRAY, METERED NASAL DAILY
Qty: 17 G | Refills: 2 | Status: SHIPPED | OUTPATIENT
Start: 2022-02-21 | End: 2023-05-12

## 2022-02-21 NOTE — PROGRESS NOTES
Subjective:       Patient ID: Dimple Reinoso is a 36 y.o. female.    Chief Complaint: Establish Care    The patient is a 36-year-old  female the presents today to establish care.  She has a history of iron deficiency anemia and lower extremity edema.  She has been seen by Dr. Espinosa in Cardiology in the past secondary to this lower extremity edema.  Workup workup included an echo and a stress test.  Echo showed some mild hypertrophy but normal systolic function with an EF of 55 percent.  Also normal diastolic function.  Stress test showed no evidence of ischemia.  She continues to have some issues with lower extremity edema.  It is worse at the end of the day.  Better when she 1st gets.  She also complains of some sinus congestion and headache since Wednesday.  No fever and no cough.  Today she is resting comfortably in no distress.  She is afebrile.  Blood pressure is 140/90 otherwise vital signs are stable.    Review of Systems   Constitutional: Negative for appetite change, chills, fatigue and fever.   HENT: Positive for sinus pressure/congestion. Negative for nasal congestion, ear pain, hearing loss and sore throat.    Eyes: Negative for pain, redness and visual disturbance.   Respiratory: Negative for apnea, cough, shortness of breath and wheezing.    Cardiovascular: Positive for leg swelling. Negative for chest pain and palpitations.   Gastrointestinal: Negative for abdominal pain, constipation, diarrhea and nausea.   Endocrine: Negative for cold intolerance, heat intolerance and polyuria.   Genitourinary: Negative for dysuria and hematuria.   Musculoskeletal: Negative for arthralgias, back pain, joint swelling, myalgias and neck pain.   Integumentary:  Negative for pallor, rash and wound.   Allergic/Immunologic: Negative for immunocompromised state.   Neurological: Positive for headaches. Negative for tremors, seizures, weakness and memory loss.   Hematological: Negative for adenopathy.    Psychiatric/Behavioral: Negative for confusion, dysphoric mood and sleep disturbance. The patient is not nervous/anxious.          Objective:      Physical Exam  Vitals and nursing note reviewed.   Constitutional:       General: She is not in acute distress.     Appearance: Normal appearance. She is obese. She is not ill-appearing.   HENT:      Head: Normocephalic and atraumatic.      Right Ear: External ear normal.      Left Ear: External ear normal.      Nose: Nose normal.      Mouth/Throat:      Pharynx: Oropharynx is clear.   Eyes:      Extraocular Movements: Extraocular movements intact.      Conjunctiva/sclera: Conjunctivae normal.      Pupils: Pupils are equal, round, and reactive to light.   Cardiovascular:      Rate and Rhythm: Normal rate and regular rhythm.      Pulses: Normal pulses.      Heart sounds: Normal heart sounds. No murmur heard.  Pulmonary:      Effort: No respiratory distress.      Breath sounds: Normal breath sounds. No wheezing or rales.   Abdominal:      General: Bowel sounds are normal.      Palpations: Abdomen is soft.   Musculoskeletal:         General: Normal range of motion.      Cervical back: Normal range of motion and neck supple.      Right lower leg: Edema present.      Left lower leg: Edema present.      Comments: Trace pre tibial edema   Skin:     General: Skin is warm and dry.      Capillary Refill: Capillary refill takes less than 2 seconds.      Coloration: Skin is not pale.   Neurological:      General: No focal deficit present.      Mental Status: She is alert and oriented to person, place, and time.      Cranial Nerves: No cranial nerve deficit.      Sensory: No sensory deficit.   Psychiatric:         Mood and Affect: Mood normal.         Judgment: Judgment normal.         Assessment:       Problem List Items Addressed This Visit        Neuro    Sinus headache       Cardiac/Vascular    Elevated BP without diagnosis of hypertension       Oncology    History of iron  deficiency anemia    Relevant Orders    CBC Auto Differential    Ferritin    Iron and TIBC       Other    Bilateral lower extremity edema    Relevant Orders    COMPRESSION STOCKINGS      Other Visit Diagnoses     Encounter to establish care with new doctor    -  Primary          Plan:       1. The patient presents today to establish care.  She is up-to-date on age-appropriate health maintenance screenings.  She has a maternal grandmother with breast cancer.  We have discussed the importance of monthly self breast exams.  We have discussed new recommendations for colonoscopy to begin at age 45.    2. History of iron deficiency anemia-she has been on iron supplements in the past.  Last H&H was 10 and 34. This was back in September of 2021. MCV was 75 and RDW was 17.5.  We are going to repeat a CBC today and also check iron studies.  If needed we can resume her ferrous sulfate.  Her iron deficiency anemia is felt to be related to menorrhagia.    3. Elevated blood pressure-/90.  I do not get too concerned with a 1 time blood pressure reading.  She is on hydrochlorothiazide 25 mg daily.  This was started for fluid.  We will continue with current care.  Monitor blood pressures and make adjustments as needed.    4. Bilateral lower extremity edema-felt to be dependent.  We have discussed using compression hose.  She should wear these daily when she is on her feet.  Prop legs up when seated or laying down.    5. Sinus headache-with sinus congestion.  We will try Nasonex to see if this will help.    Return to care in 1 year sooner if needed

## 2022-07-06 ENCOUNTER — PATIENT MESSAGE (OUTPATIENT)
Dept: OBSTETRICS AND GYNECOLOGY | Facility: CLINIC | Age: 37
End: 2022-07-06
Payer: COMMERCIAL

## 2022-07-25 ENCOUNTER — OFFICE VISIT (OUTPATIENT)
Dept: CARDIOLOGY | Facility: CLINIC | Age: 37
End: 2022-07-25
Payer: COMMERCIAL

## 2022-07-25 VITALS
SYSTOLIC BLOOD PRESSURE: 124 MMHG | OXYGEN SATURATION: 95 % | HEIGHT: 66 IN | DIASTOLIC BLOOD PRESSURE: 90 MMHG | HEART RATE: 82 BPM | WEIGHT: 293 LBS | BODY MASS INDEX: 47.09 KG/M2

## 2022-07-25 DIAGNOSIS — I10 HYPERTENSION, UNSPECIFIED TYPE: Primary | ICD-10-CM

## 2022-07-25 DIAGNOSIS — I10 ESSENTIAL HYPERTENSION: ICD-10-CM

## 2022-07-25 DIAGNOSIS — R60.0 BILATERAL LOWER EXTREMITY EDEMA: ICD-10-CM

## 2022-07-25 DIAGNOSIS — E66.01 MORBID OBESITY: ICD-10-CM

## 2022-07-25 PROCEDURE — 1159F PR MEDICATION LIST DOCUMENTED IN MEDICAL RECORD: ICD-10-PCS | Mod: ,,, | Performed by: INTERNAL MEDICINE

## 2022-07-25 PROCEDURE — 99214 PR OFFICE/OUTPT VISIT, EST, LEVL IV, 30-39 MIN: ICD-10-PCS | Mod: S$PBB,,, | Performed by: INTERNAL MEDICINE

## 2022-07-25 PROCEDURE — 3080F DIAST BP >= 90 MM HG: CPT | Mod: ,,, | Performed by: INTERNAL MEDICINE

## 2022-07-25 PROCEDURE — 93010 ELECTROCARDIOGRAM REPORT: CPT | Mod: S$PBB,,, | Performed by: INTERNAL MEDICINE

## 2022-07-25 PROCEDURE — 3008F BODY MASS INDEX DOCD: CPT | Mod: ,,, | Performed by: INTERNAL MEDICINE

## 2022-07-25 PROCEDURE — 3008F PR BODY MASS INDEX (BMI) DOCUMENTED: ICD-10-PCS | Mod: ,,, | Performed by: INTERNAL MEDICINE

## 2022-07-25 PROCEDURE — 3074F SYST BP LT 130 MM HG: CPT | Mod: ,,, | Performed by: INTERNAL MEDICINE

## 2022-07-25 PROCEDURE — 93005 ELECTROCARDIOGRAM TRACING: CPT | Mod: PBBFAC | Performed by: INTERNAL MEDICINE

## 2022-07-25 PROCEDURE — 1160F RVW MEDS BY RX/DR IN RCRD: CPT | Mod: ,,, | Performed by: INTERNAL MEDICINE

## 2022-07-25 PROCEDURE — 93010 EKG 12-LEAD: ICD-10-PCS | Mod: S$PBB,,, | Performed by: INTERNAL MEDICINE

## 2022-07-25 PROCEDURE — 1160F PR REVIEW ALL MEDS BY PRESCRIBER/CLIN PHARMACIST DOCUMENTED: ICD-10-PCS | Mod: ,,, | Performed by: INTERNAL MEDICINE

## 2022-07-25 PROCEDURE — 99214 OFFICE O/P EST MOD 30 MIN: CPT | Mod: S$PBB,,, | Performed by: INTERNAL MEDICINE

## 2022-07-25 PROCEDURE — 99214 OFFICE O/P EST MOD 30 MIN: CPT | Mod: PBBFAC | Performed by: INTERNAL MEDICINE

## 2022-07-25 PROCEDURE — 3074F PR MOST RECENT SYSTOLIC BLOOD PRESSURE < 130 MM HG: ICD-10-PCS | Mod: ,,, | Performed by: INTERNAL MEDICINE

## 2022-07-25 PROCEDURE — 1159F MED LIST DOCD IN RCRD: CPT | Mod: ,,, | Performed by: INTERNAL MEDICINE

## 2022-07-25 PROCEDURE — 3080F PR MOST RECENT DIASTOLIC BLOOD PRESSURE >= 90 MM HG: ICD-10-PCS | Mod: ,,, | Performed by: INTERNAL MEDICINE

## 2022-07-25 NOTE — PROGRESS NOTES
Cardiology Clinic Note:    PCP: Abiodun Diaz DO    REFERRING PHYSICIAN: Abiodun Diaz DO    CHIEF COMPLAINT:   Chief Complaint   Patient presents with    edema in bilateral lower  extremities        HISTORY OF PRESENT ILLNESS:  Dimple Reinoso is a 37 y.o. female who presents for evaluation of lower extremity edema        Patient states she is active working in yard without symptoms of chest pain, pressure or shortness of breath.  She reports pedal edema, bilateral, mild occurs several days a week, resolves overnight.   She wears compression socks occasionally which controls lower extremity edema.  Patient states she has tried weight loss programs but has not been consistent.     Review of Systems   Constitutional: Negative for diaphoresis, malaise/fatigue, night sweats and weight gain.   HENT: Negative for congestion, ear pain, hearing loss, nosebleeds and sore throat.    Eyes: Negative for blurred vision, double vision, pain, photophobia and visual disturbance.   Cardiovascular: Positive for leg swelling. Negative for chest pain, claudication, dyspnea on exertion, irregular heartbeat, near-syncope, orthopnea, palpitations and syncope.   Respiratory: Negative for cough, shortness of breath, sleep disturbances due to breathing, snoring and wheezing.    Endocrine: Negative for cold intolerance, heat intolerance, polydipsia, polyphagia and polyuria.   Hematologic/Lymphatic: Negative for bleeding problem. Does not bruise/bleed easily.   Skin: Negative for dry skin, flushing, itching, rash and skin cancer.   Musculoskeletal: Negative for arthritis, back pain, falls, joint pain, muscle cramps, muscle weakness and myalgias.   Gastrointestinal: Negative for abdominal pain, change in bowel habit, constipation, diarrhea, dysphagia, heartburn, nausea and vomiting.   Genitourinary: Negative for bladder incontinence, dysuria, flank pain, frequency and nocturia.   Neurological: Negative for dizziness, focal weakness,  "headaches, light-headedness, loss of balance, numbness, paresthesias and seizures.   Psychiatric/Behavioral: Negative for depression, memory loss and substance abuse. The patient is not nervous/anxious.    Allergic/Immunologic: Negative for environmental allergies.          PAST MEDICAL HISTORY:  Past Medical History:   Diagnosis Date    Hypertension        PAST SURGICAL HISTORY:  Past Surgical History:   Procedure Laterality Date     SECTION  2017    CHOLECYSTECTOMY  2004    REDUCTION OF BOTH BREASTS Bilateral 2006       SOCIAL HISTORY:  Social History     Socioeconomic History    Marital status:    Tobacco Use    Smoking status: Never Smoker    Smokeless tobacco: Never Used   Substance and Sexual Activity    Alcohol use: Yes     Comment: glass of wine maybe twice a month     Drug use: Never    Sexual activity: Yes     Partners: Male     Birth control/protection: None       FAMILY HISTORY:  Family History   Problem Relation Age of Onset    Breast cancer Maternal Grandmother     Diabetes Maternal Grandmother        ALLERGIES:  Allergies as of 2022    (No Known Allergies)         MEDICATIONS:  Current Outpatient Medications on File Prior to Visit   Medication Sig Dispense Refill    ferrous sulfate (FEOSOL) 325 mg (65 mg iron) Tab tablet Take 325 mg by mouth once daily.      hydroCHLOROthiazide (HYDRODIURIL) 25 MG tablet Take 25 mg by mouth once daily.      mometasone (NASONEX) 50 mcg/actuation nasal spray 2 sprays by Nasal route once daily. 17 g 2    phentermine (ADIPEX-P) 37.5 mg tablet Take 37.5 mg by mouth once daily.       No current facility-administered medications on file prior to visit.          PHYSICAL EXAM:  Blood pressure (!) 124/90, pulse 82, height 5' 6" (1.676 m), weight (!) 140.2 kg (309 lb), SpO2 95 %.  Wt Readings from Last 3 Encounters:   22 (!) 140.2 kg (309 lb)   22 135.2 kg (298 lb)   22 135.2 kg (298 lb)      Body mass index is 49.87 " kg/m².    Physical Exam  Vitals and nursing note reviewed.   Constitutional:       Appearance: Normal appearance. She is obese.   HENT:      Head: Normocephalic and atraumatic.      Right Ear: External ear normal.      Left Ear: External ear normal.   Eyes:      General: No scleral icterus.        Right eye: No discharge.         Left eye: No discharge.      Extraocular Movements: Extraocular movements intact.      Conjunctiva/sclera: Conjunctivae normal.      Pupils: Pupils are equal, round, and reactive to light.   Cardiovascular:      Rate and Rhythm: Normal rate and regular rhythm.      Pulses: Normal pulses.      Heart sounds: Normal heart sounds. No murmur heard.    No friction rub. No gallop.   Pulmonary:      Effort: Pulmonary effort is normal.      Breath sounds: Normal breath sounds. No wheezing, rhonchi or rales.   Chest:      Chest wall: No tenderness.   Abdominal:      General: Abdomen is flat. Bowel sounds are normal. There is no distension.      Palpations: Abdomen is soft.      Tenderness: There is no abdominal tenderness. There is no guarding or rebound.   Musculoskeletal:         General: No swelling or tenderness. Normal range of motion.      Cervical back: Normal range of motion and neck supple.      Right lower leg: Edema present.      Left lower leg: Edema present.   Skin:     General: Skin is warm and dry.      Findings: No erythema or rash.   Neurological:      General: No focal deficit present.      Mental Status: She is alert and oriented to person, place, and time.      Cranial Nerves: No cranial nerve deficit.      Motor: No weakness.      Gait: Gait normal.   Psychiatric:         Mood and Affect: Mood normal.         Behavior: Behavior normal.         Thought Content: Thought content normal.         Judgment: Judgment normal.          LABS REVIEWED:  Lab Results   Component Value Date    WBC 7.73 02/21/2022    RBC 4.89 02/21/2022    HGB 11.2 (L) 02/21/2022    HCT 37.7 (L) 02/21/2022     MCV 77.1 (L) 02/21/2022    MCH 22.9 (L) 02/21/2022    MCHC 29.7 (L) 02/21/2022    RDW 18.4 (H) 02/21/2022     02/21/2022    MPV 10.9 02/21/2022    NRBC 0.0 02/21/2022     Lab Results   Component Value Date     05/05/2021    K 3.8 05/05/2021     05/05/2021    CO2 27 05/05/2021    BUN 15 05/05/2021     Lab Results   Component Value Date    AST 9 (L) 05/05/2021    ALT 18 05/05/2021     Lab Results   Component Value Date    GLU 75 05/05/2021     Lab Results   Component Value Date    CHOL 126 05/05/2021    HDL 43 05/05/2021    TRIG 115 05/05/2021    CHOLHDL 2.9 05/05/2021       CARDIAC STUDIES REVIEWED:  EKG  7/25/22 - Normal sinus rhythm with sinus arthythmia.  Rightward axis.  Possible anterior infarct, age undetermined.              1/2622 - sinus arrhythmia   Echo 6/18/21  EF 55%, Mild left atrial enlargement.  Mild-to-moderate mitral regurgitation.  Stress test normal    OTHER IMAGING STUDIES REVIEWED:    ASSESSMENT:   Hypertension, unspecified type  -     EKG 12-lead; Future      PLAN:  1.  Bilateral lower extremity edema - has improved.   2.  Hypertension, adequately controlled.   3.  Morbid obesity - weight up 12 lbs. discussed Healthy lifestyle changes. ,  Walk 10,000 steps 6 days weekly.   Discussed Weight Watchers or [employer] irasema.

## 2023-01-25 DIAGNOSIS — I10 ESSENTIAL HYPERTENSION: Primary | ICD-10-CM

## 2023-04-20 ENCOUNTER — OFFICE VISIT (OUTPATIENT)
Dept: FAMILY MEDICINE | Facility: CLINIC | Age: 38
End: 2023-04-20

## 2023-04-20 VITALS
BODY MASS INDEX: 47.09 KG/M2 | OXYGEN SATURATION: 100 % | HEIGHT: 66 IN | HEART RATE: 84 BPM | SYSTOLIC BLOOD PRESSURE: 129 MMHG | WEIGHT: 293 LBS | DIASTOLIC BLOOD PRESSURE: 89 MMHG | RESPIRATION RATE: 18 BRPM | TEMPERATURE: 99 F

## 2023-04-20 DIAGNOSIS — I10 HYPERTENSION, UNSPECIFIED TYPE: Primary | ICD-10-CM

## 2023-04-20 DIAGNOSIS — K21.9 GASTROESOPHAGEAL REFLUX DISEASE WITHOUT ESOPHAGITIS: ICD-10-CM

## 2023-04-20 PROCEDURE — 99214 PR OFFICE/OUTPT VISIT, EST, LEVL IV, 30-39 MIN: ICD-10-PCS | Mod: ,,, | Performed by: NURSE PRACTITIONER

## 2023-04-20 PROCEDURE — 99214 OFFICE O/P EST MOD 30 MIN: CPT | Mod: ,,, | Performed by: NURSE PRACTITIONER

## 2023-04-20 NOTE — PROGRESS NOTES
Yessica Verduzco DNP, MAGNUS    [address] [place] [address]     PATIENT NAME: Dimple Reinoso  : 1985  DATE: 23  MRN: [id number]      Billing Provider: Yessica Verduzco DNP, MAGNUS  Level of Service:   Patient PCP Information       Provider PCP Type    Paresh Robertson DO General            Reason for Visit / Chief Complaint: Heartburn (Complains of indigestion, heartburn started last night around 7:00 pm. Stated she took Zantac 150 mg and usually it works. But last night she also had to take OTC antacids (10) of them and did not give her complete relief. Stated this morning she took (2) Kinjal Dalton gummies and another Zantac. Stated she only takes OTC medication no longer on prescription maintenance medication. Wants to use [facility].) and Hypertension (Stated she has not taken her blood pressure medication HCTZ in over one year. Stated she really just used  the HCTZ for fluid. Stated she has H/O abnormal EKG and saw Dr Espinosa and stated he ran test and his test were all 'okay'.  Stated she works for the [employer] in [place] and works from her own home. )       Update PCP  Update Chief Complaint         History of Present Illness / Problem Focused Workflow     Dimple Reinoso presents to the clinic with Heartburn (Complains of indigestion, heartburn started last night around 7:00 pm. Stated she took Zantac 150 mg and usually it works. But last night she also had to take OTC antacids (10) of them and did not give her complete relief. Stated this morning she took (2) Kinjal Dalton gummies and another Zantac. Stated she only takes OTC medication no longer on prescription maintenance medication. Wants to use [facility].) and Hypertension (Stated she has not taken her blood pressure medication HCTZ in over one year. Stated she really just used  the HCTZ for fluid. Stated she has H/O abnormal EKG and saw Dr Espinosa and stated he ran test and  his test were all 'okay'.  Stated she works for the [employer] in [place] and works from her own home. )     Pt denies any chest discomfort or reflux symptoms since being at the clinic.       Review of Systems     Review of Systems   Constitutional:  Negative for appetite change, fatigue, fever and unexpected weight change.   HENT:  Negative for hearing loss.    Eyes:  Negative for visual disturbance.   Respiratory:  Negative for shortness of breath.    Cardiovascular:  Negative for chest pain.   Gastrointestinal:  Positive for nausea and reflux. Negative for abdominal pain, constipation, diarrhea and vomiting.   Genitourinary:  Negative for dysuria.   Musculoskeletal:  Negative for back pain.   Psychiatric/Behavioral:  Negative for sleep disturbance.       Medical / Social / Family History     Past Medical History:   Diagnosis Date    Anemia     Bilateral lower extremity edema     Hypertension     Morbid obesity        Past Surgical History:   Procedure Laterality Date     SECTION  2017    CHOLECYSTECTOMY  2004    REDUCTION OF BOTH BREASTS Bilateral 2006       Social History  Ms. Dimple Reinoso  reports that she has never smoked. She has never been exposed to tobacco smoke. She has never used smokeless tobacco. She reports current alcohol use. She reports that she does not use drugs.    Family History  Ms. Dimple Reinoso's family history includes Breast cancer in her maternal grandmother; Cancer in her paternal grandfather; Diabetes in her maternal grandmother; Hypertension in her father; No Known Problems in her maternal grandfather.    Medications and Allergies     Medications  No outpatient medications have been marked as taking for the 23 encounter (Office Visit) with Yessica Verduzco DNP, FNP.       Allergies  Review of patient's allergies indicates:  No Known Allergies    Physical Examination     Vitals:    23 1344 23 1346 23 1518   BP: (!) 159/97 (!) 158/96 129/89  "  BP Location: Left arm Right arm Right arm   Patient Position: Sitting Sitting Sitting   BP Method: Large (Automatic) Large (Automatic) Large (Automatic)   Pulse: 84     Resp: 18     Temp: 99.1 °F (37.3 °C)     TempSrc: Oral     SpO2: 100%     Weight: (!) 142.9 kg (315 lb)     Height: 5' 6" (1.676 m)       Physical Exam  Vitals and nursing note reviewed.   Constitutional:       General: She is not in acute distress.     Appearance: She is obese.   HENT:      Nose: Nose normal.      Mouth/Throat:      Mouth: Mucous membranes are moist.   Eyes:      Pupils: Pupils are equal, round, and reactive to light.   Cardiovascular:      Rate and Rhythm: Normal rate and regular rhythm.      Pulses: Normal pulses.      Heart sounds: Normal heart sounds. No murmur heard.  Pulmonary:      Effort: Pulmonary effort is normal. No respiratory distress.      Breath sounds: Normal breath sounds. No wheezing, rhonchi or rales.   Chest:      Chest wall: No tenderness.   Abdominal:      General: Bowel sounds are normal.      Palpations: Abdomen is soft.   Musculoskeletal:         General: Normal range of motion.      Cervical back: Normal range of motion and neck supple.      Right lower leg: No edema.      Left lower leg: No edema.   Skin:     General: Skin is warm and dry.   Neurological:      General: No focal deficit present.      Mental Status: She is alert and oriented to person, place, and time.        Assessment and Plan (including Health Maintenance)      Problem List  Smart Sets  Document Outside HM   :    Plan:         Health Maintenance Due   Topic Date Due    Hepatitis C Screening  Never done    COVID-19 Vaccine (1) Never done    HIV Screening  Never done    TETANUS VACCINE  Never done    Hemoglobin A1c (Diabetic Prevention Screening)  Never done       Problem List Items Addressed This Visit    None  Visit Diagnoses       Hypertension, unspecified type    -  Primary    Gastroesophageal reflux disease without esophagitis        " BMI 50.0-59.9, adult              Hypertension, unspecified type    Gastroesophageal reflux disease without esophagitis    BMI 50.0-59.9, adult       Health Maintenance Topics with due status: Not Due       Topic Last Completion Date    Cervical Cancer Screening 07/26/2021       Procedures     No future appointments.       Follow up in about 2 weeks (around 5/4/2023).     Signature:  Yessica Verduzco DNP, FNP  [address] [place] [address]  Phone #: [phone]  Fax #: [phone]    Date of encounter: 4/20/23    Patient Instructions   Monitor blood pressure and follow up with primary care provider in 2 weeks with numbers.

## 2023-04-26 PROBLEM — I10 ESSENTIAL HYPERTENSION: Chronic | Status: ACTIVE | Noted: 2021-06-27

## 2023-04-26 PROBLEM — E66.01 MORBID OBESITY: Chronic | Status: ACTIVE | Noted: 2021-06-27

## 2023-04-29 ENCOUNTER — OFFICE VISIT (OUTPATIENT)
Dept: FAMILY MEDICINE | Facility: CLINIC | Age: 38
End: 2023-04-29

## 2023-04-29 VITALS
SYSTOLIC BLOOD PRESSURE: 136 MMHG | HEART RATE: 83 BPM | TEMPERATURE: 98 F | BODY MASS INDEX: 47.09 KG/M2 | OXYGEN SATURATION: 98 % | RESPIRATION RATE: 18 BRPM | WEIGHT: 293 LBS | HEIGHT: 66 IN | DIASTOLIC BLOOD PRESSURE: 93 MMHG

## 2023-04-29 DIAGNOSIS — J02.9 VIRAL PHARYNGITIS: ICD-10-CM

## 2023-04-29 DIAGNOSIS — J30.1 SEASONAL ALLERGIC RHINITIS DUE TO POLLEN: Primary | ICD-10-CM

## 2023-04-29 PROCEDURE — 96372 PR INJECTION,THERAP/PROPH/DIAG2ST, IM OR SUBCUT: ICD-10-PCS | Mod: ,,, | Performed by: NURSE PRACTITIONER

## 2023-04-29 PROCEDURE — 96372 THER/PROPH/DIAG INJ SC/IM: CPT | Mod: ,,, | Performed by: NURSE PRACTITIONER

## 2023-04-29 PROCEDURE — 99213 PR OFFICE/OUTPT VISIT, EST, LEVL III, 20-29 MIN: ICD-10-PCS | Mod: 25,,, | Performed by: NURSE PRACTITIONER

## 2023-04-29 PROCEDURE — 99213 OFFICE O/P EST LOW 20 MIN: CPT | Mod: 25,,, | Performed by: NURSE PRACTITIONER

## 2023-04-29 PROCEDURE — 99051 MED SERV EVE/WKEND/HOLIDAY: CPT | Mod: ,,, | Performed by: NURSE PRACTITIONER

## 2023-04-29 PROCEDURE — 99051 PR MEDICAL SERVICES, EVE/WKEND/HOLIDAY: ICD-10-PCS | Mod: ,,, | Performed by: NURSE PRACTITIONER

## 2023-04-29 RX ORDER — DEXAMETHASONE SODIUM PHOSPHATE 4 MG/ML
8 INJECTION, SOLUTION INTRA-ARTICULAR; INTRALESIONAL; INTRAMUSCULAR; INTRAVENOUS; SOFT TISSUE
Status: COMPLETED | OUTPATIENT
Start: 2023-04-29 | End: 2023-04-29

## 2023-04-29 RX ORDER — AZITHROMYCIN 250 MG/1
TABLET, FILM COATED ORAL
Qty: 6 TABLET | Refills: 0 | Status: SHIPPED | OUTPATIENT
Start: 2023-04-29 | End: 2023-05-04

## 2023-04-29 RX ADMIN — DEXAMETHASONE SODIUM PHOSPHATE 8 MG: 4 INJECTION, SOLUTION INTRA-ARTICULAR; INTRALESIONAL; INTRAMUSCULAR; INTRAVENOUS; SOFT TISSUE at 09:04

## 2023-04-29 NOTE — ASSESSMENT & PLAN NOTE
Stay hydrated  Given exposure by family member in clinic today with strep  Gargle with warm salt water often as tolerated to help with sore throat  Drink cool fluids with water being the best to help with throat pain and staying hydrated  To help with fever greater than 100: Tylenol or Ibuprofen as directed with dose approximate for weight and age  Tylenol maybe administered every four hours and ibuprofen maybe administered every 6 hours  Complete the entire course of oral antibiotic ordered for you (only stop antibiotic if allergy occurs)  If no improvement within 3 days, return to clinic for recheck

## 2023-04-29 NOTE — PATIENT INSTRUCTIONS
Consider local honey 2 teaspoons daily to help reduce allergies--- be sure to change honey with seasons (light sweet honey is spring/summer and darker bitter honey is fall/winter)  Pillows and blankets not washed in washing machine, place in dryer and run on hot setting for 10 minutes once a week to reduce allergens on bed linens.   Also dust ceiling fans weekly or any other fan in bedroom  Vacuum or sweep and mop bedroom floor every 3-4 days to help reduce allergens   Decadron 8 mg IM today  Obtain Alavert D 12 over the counter and use as needed for sinus pressure and sinus headache  Continue Mucinex 400- 600 mg three times daily  as needed for congestion    Given exposure to strep throat:  Gargle with warm salt water often as tolerated to help with sore throat  Drink cool fluids with water being the best to help with throat pain and staying hydrated  To help with fever greater than 100: Tylenol or Ibuprofen as directed with dose approximate for weight and age  Tylenol maybe administered every four hours and ibuprofen maybe administered every 6 hours  Complete the entire course of oral antibiotic ordered for you (only stop antibiotic if allergy occurs)  If no improvement within 3 days, return to clinic for recheck

## 2023-04-29 NOTE — ASSESSMENT & PLAN NOTE
Decadron 8 mg im today  Obtain Alavert D 12 over the counter and use as needed for sinus pressure and sinus headache  Continue Mucinex 400- 600 mg three times daily  as needed for congestion  Consider local honey 2 teaspoons daily to help reduce allergies--- be sure to change honey with seasons (light sweet honey is spring/summer and darker bitter honey is fall/winter)  Pillows and blankets not washed in washing machine, place in dryer and run on hot setting for 10 minutes once a week to reduce allergens on bed linens.   Also dust ceiling fans weekly or any other fan in bedroom  Vacuum or sweep and mop bedroom floor every 3-4 days to help reduce allergens

## 2023-04-29 NOTE — PROGRESS NOTES
MAGNUS Sandoval    [address] [place] [address]     PATIENT NAME: Dimple Reinoso  : 1985  DATE: 23  MRN: [id number]      Billing Provider: MAGNUS Sandoval  Level of Service: MI OFFICE/OUTPT VISIT, EST, LEVL III, 20-29 MIN    ASSESSMENT AND PLAN:      Problem List Items Addressed This Visit          ENT    Seasonal allergic rhinitis due to pollen - Primary    Current Assessment & Plan     Decadron 8 mg im today  Obtain Alavert D 12 over the counter and use as needed for sinus pressure and sinus headache  Continue Mucinex 400- 600 mg three times daily  as needed for congestion  Consider local honey 2 teaspoons daily to help reduce allergies--- be sure to change honey with seasons (light sweet honey is spring/summer and darker bitter honey is fall/winter)  Pillows and blankets not washed in washing machine, place in dryer and run on hot setting for 10 minutes once a week to reduce allergens on bed linens.   Also dust ceiling fans weekly or any other fan in bedroom  Vacuum or sweep and mop bedroom floor every 3-4 days to help reduce allergens             Relevant Medications    dexAMETHasone injection 8 mg (Completed)    Viral pharyngitis    Current Assessment & Plan     Stay hydrated  Given exposure by family member in clinic today with strep  Gargle with warm salt water often as tolerated to help with sore throat  Drink cool fluids with water being the best to help with throat pain and staying hydrated  To help with fever greater than 100: Tylenol or Ibuprofen as directed with dose approximate for weight and age  Tylenol maybe administered every four hours and ibuprofen maybe administered every 6 hours  Complete the entire course of oral antibiotic ordered for you (only stop antibiotic if allergy occurs)  If no improvement within 3 days, return to clinic for recheck              Relevant Medications    azithromycin (Z-DWAIN) 250 MG tablet        Health Maintenance Topics with due status: Not Due       Topic Last Completion Date    Cervical Cancer Screening 2021     No future appointments.   No follow-ups on file. or sooner as needed.      CHIEF COMPLAINT: Sinus Problem (State she felt her throat to start hurting on Thursday. Sinus have been draining. Have taking mucinex. ) and Nasal Congestion           HISTORY OF PRESENT ILLNESS:  Dimple Reinoso is a 37 y.o. female who presents to the clinic today     Dimple Reinoso presents to the clinic with Sinus Problem (State she felt her throat to start hurting on Thursday. Sinus have been draining. Have taking mucinex. ) and Nasal Congestion     Sinus Problem  This is a new problem. Episode onset: started 2023 after rainy weather. The problem is unchanged. There has been no fever. Her pain is at a severity of 4/10. The pain is moderate. Associated symptoms include congestion, headaches, a hoarse voice, sinus pressure and a sore throat. Past treatments include acetaminophen. The treatment provided mild relief.     PAST MEDICAL HISTORY:      Past Medical History:   Diagnosis Date    Anemia     Bilateral lower extremity edema     Hypertension     Morbid obesity      PAST SURGICAL HISTORY:  Past Surgical History:   Procedure Laterality Date     SECTION  2017    CHOLECYSTECTOMY  2004    REDUCTION OF BOTH BREASTS Bilateral 2006     SOCIAL HISTORY:  Social History     Socioeconomic History    Marital status:      Spouse name: Kem    Number of children: 3   Tobacco Use    Smoking status: Never     Passive exposure: Never    Smokeless tobacco: Never   Substance and Sexual Activity    Alcohol use: Yes     Comment: glass of wine maybe twice a month     Drug use: Never    Sexual activity: Yes     Partners: Male     Birth control/protection: None     FAMILY HISTORY:       Family History   Problem Relation Age of Onset    Hypertension Father     Breast cancer Maternal Grandmother     Diabetes  "Maternal Grandmother     No Known Problems Maternal Grandfather     Cancer Paternal Grandfather        ALLERGIES AND MEDICATIONS: updated and reviewed.       Review of patient's allergies indicates:  No Known Allergies  Medication List with Changes/Refills   New Medications    AZITHROMYCIN (Z-DWAIN) 250 MG TABLET    Take 2 tablets by mouth on day 1; Take 1 tablet by mouth on days 2-5   Current Medications    FERROUS SULFATE (FEOSOL) 325 MG (65 MG IRON) TAB TABLET    Take 325 mg by mouth once daily.    HYDROCHLOROTHIAZIDE (HYDRODIURIL) 25 MG TABLET    Take 25 mg by mouth once daily.    MOMETASONE (NASONEX) 50 MCG/ACTUATION NASAL SPRAY    2 sprays by Nasal route once daily.    PHENTERMINE (ADIPEX-P) 37.5 MG TABLET    Take 37.5 mg by mouth once daily.       SCREENING HISTORY:     Health Maintenance         Date Due Completion Date    Hepatitis C Screening Never done ---    COVID-19 Vaccine (1) Never done ---    HIV Screening Never done ---    TETANUS VACCINE Never done ---    Hemoglobin A1c (Diabetic Prevention Screening) Never done ---    Cervical Cancer Screening 07/26/2024 7/26/2021            REVIEW OF SYSTEMS:   Review of Systems   HENT:  Positive for nasal congestion, hoarse voice, sinus pressure/congestion and sore throat.    Neurological:  Positive for headaches.       PHYSICAL EXAM:         BP (!) 136/93 (BP Location: Right arm, Patient Position: Sitting, BP Method: Large (Automatic))   Pulse 83   Temp 98.1 °F (36.7 °C) (Oral)   Resp 18   Ht 5' 6" (1.676 m)   Wt (!) 144.7 kg (319 lb)   LMP 04/06/2023 (Approximate)   SpO2 98%   BMI 51.49 kg/m²   Wt Readings from Last 3 Encounters:   04/29/23 (!) 144.7 kg (319 lb)   04/20/23 (!) 142.9 kg (315 lb)   07/25/22 (!) 140.2 kg (309 lb)     BP Readings from Last 3 Encounters:   04/29/23 (!) 136/93   04/20/23 129/89   07/25/22 (!) 124/90     Estimated body mass index is 51.49 kg/m² as calculated from the following:    Height as of this encounter: 5' 6" (1.676 m).   "  Weight as of this encounter: 144.7 kg (319 lb).     Physical Exam  Vitals reviewed.   HENT:      Head: Normocephalic.      Right Ear: Tympanic membrane normal.      Left Ear: Tympanic membrane normal.      Nose: Congestion present.      Comments: Pale hypertrophic turbinates       Mouth/Throat:      Mouth: Mucous membranes are moist.   Eyes:      Pupils: Pupils are equal, round, and reactive to light.   Cardiovascular:      Rate and Rhythm: Normal rate and regular rhythm.      Pulses: Normal pulses.   Pulmonary:      Effort: Pulmonary effort is normal.      Breath sounds: Normal breath sounds.   Musculoskeletal:      Cervical back: Normal range of motion and neck supple.   Skin:     General: Skin is warm and dry.      Capillary Refill: Capillary refill takes less than 2 seconds.   Neurological:      Mental Status: She is alert and oriented to person, place, and time.       LABS:     I have reviewed old labs below:  Lab Results   Component Value Date    WBC 7.73 02/21/2022    HGB 11.2 (L) 02/21/2022    HCT 37.7 (L) 02/21/2022    MCV 77.1 (L) 02/21/2022     02/21/2022     05/05/2021    K 3.8 05/05/2021     05/05/2021    CALCIUM 8.6 05/05/2021    CO2 27 05/05/2021    GLU 75 05/05/2021    BUN 15 05/05/2021    CREATININE 0.72 05/05/2021    ANIONGAP 11 05/05/2021    ESTGFRAFRICA 117 05/05/2021    PROT 7.6 05/05/2021    ALBUMIN 3.6 05/05/2021    BILITOT 0.4 05/05/2021    ALKPHOS 104 (H) 05/05/2021    ALT 18 05/05/2021    AST 9 (L) 05/05/2021    CHOL 126 05/05/2021    TRIG 115 05/05/2021    HDL 43 05/05/2021    LDLCALC 60 05/05/2021    TSH 2.150 05/05/2021           Signature:  Marina Mae [address] [place] [address]  Phone #: [phone]  Fax #: [phone]       Date of encounter: 4/29/23    Patient Instructions   Consider local honey 2 teaspoons daily to help reduce allergies--- be sure to change honey with seasons (light sweet honey is  spring/summer and darker bitter honey is fall/winter)  Pillows and blankets not washed in washing machine, place in dryer and run on hot setting for 10 minutes once a week to reduce allergens on bed linens.   Also dust ceiling fans weekly or any other fan in bedroom  Vacuum or sweep and mop bedroom floor every 3-4 days to help reduce allergens   Decadron 8 mg IM today  Obtain Alavert D 12 over the counter and use as needed for sinus pressure and sinus headache  Continue Mucinex 400- 600 mg three times daily  as needed for congestion    Given exposure to strep throat:  Gargle with warm salt water often as tolerated to help with sore throat  Drink cool fluids with water being the best to help with throat pain and staying hydrated  To help with fever greater than 100: Tylenol or Ibuprofen as directed with dose approximate for weight and age  Tylenol maybe administered every four hours and ibuprofen maybe administered every 6 hours  Complete the entire course of oral antibiotic ordered for you (only stop antibiotic if allergy occurs)  If no improvement within 3 days, return to clinic for recheck

## 2023-05-04 ENCOUNTER — TELEPHONE (OUTPATIENT)
Dept: FAMILY MEDICINE | Facility: CLINIC | Age: 38
End: 2023-05-04

## 2023-05-04 NOTE — TELEPHONE ENCOUNTER
Voicemail message received from this pt reporting that she has finished the abx prescribed by MAGNUS Rai, at last visit and she is not feeling any better and says her throat is still sore and hurts worse.  Reviewed last visit and pt complaints with Dr. Andino and received instructions that pt would need to come in to be revaluated.  Called pt and instructions given.  Pt says she can't come in tonight before closing but may attempt to come in tomorrow.

## 2023-05-12 ENCOUNTER — OFFICE VISIT (OUTPATIENT)
Dept: FAMILY MEDICINE | Facility: CLINIC | Age: 38
End: 2023-05-12

## 2023-05-12 VITALS
SYSTOLIC BLOOD PRESSURE: 150 MMHG | HEIGHT: 66 IN | TEMPERATURE: 98 F | DIASTOLIC BLOOD PRESSURE: 94 MMHG | OXYGEN SATURATION: 100 % | WEIGHT: 293 LBS | RESPIRATION RATE: 14 BRPM | BODY MASS INDEX: 47.09 KG/M2 | HEART RATE: 76 BPM

## 2023-05-12 DIAGNOSIS — B96.89 ACUTE BACTERIAL PHARYNGITIS: ICD-10-CM

## 2023-05-12 DIAGNOSIS — J30.1 SEASONAL ALLERGIC RHINITIS DUE TO POLLEN: Primary | ICD-10-CM

## 2023-05-12 DIAGNOSIS — J02.8 ACUTE BACTERIAL PHARYNGITIS: ICD-10-CM

## 2023-05-12 PROBLEM — J02.9 VIRAL PHARYNGITIS: Status: RESOLVED | Noted: 2023-04-29 | Resolved: 2023-05-12

## 2023-05-12 PROCEDURE — 99213 OFFICE O/P EST LOW 20 MIN: CPT | Mod: ,,, | Performed by: NURSE PRACTITIONER

## 2023-05-12 PROCEDURE — 99213 PR OFFICE/OUTPT VISIT, EST, LEVL III, 20-29 MIN: ICD-10-PCS | Mod: ,,, | Performed by: NURSE PRACTITIONER

## 2023-05-12 RX ORDER — METHYLPREDNISOLONE 4 MG/1
TABLET ORAL
Qty: 21 EACH | Refills: 0 | Status: SHIPPED | OUTPATIENT
Start: 2023-05-12 | End: 2023-06-02

## 2023-05-12 RX ORDER — DOXYCYCLINE 100 MG/1
100 CAPSULE ORAL 2 TIMES DAILY
Qty: 20 CAPSULE | Refills: 0 | Status: SHIPPED | OUTPATIENT
Start: 2023-05-12 | End: 2023-05-22

## 2023-05-12 NOTE — ASSESSMENT & PLAN NOTE
Resume zyrtec 10 mg daily thru May, 2023  Then repeat zyrtec 10 mg daily September thru October, 2023  Consider local honey 2 teaspoons daily to help reduce allergies--- be sure to change honey with seasons (light sweet honey is spring/summer and darker bitter honey is fall/winter)  Pillows and blankets not washed in washing machine, place in dryer and run on hot setting for 10 minutes once a week to reduce allergens on bed linens.   Also dust ceiling fans weekly or any other fan in bedroom  Vacuum or sweep and mop bedroom floor every 3-4 days to help reduce allergens

## 2023-05-12 NOTE — ASSESSMENT & PLAN NOTE
Doxycycline 100 mg twice a day for 10 days  Medrol dose krupa to help with sinus pressure/AR symptoms and nasal congestion  Gargle with warm salt water often as tolerated to help with sore throat  Drink cool fluids with water being the best to help with throat pain and staying hydrated  To help with fever greater than 100: Tylenol or Ibuprofen as directed with dose approximate for weight and age  Tylenol maybe administered every four hours and ibuprofen maybe administered every 6 hours  Complete the entire course of oral antibiotic ordered for you (only stop antibiotic if allergy occurs)  If no improvement within 3 days, return to clinic for recheck     Recommend F/U with PCP regarding blood pressure

## 2023-05-12 NOTE — PROGRESS NOTES
MAGNUS Sandoval    [address] [place] [address]     PATIENT NAME: Dimple Reinoso  : 1985  DATE: 23  MRN: [id number]      Billing Provider: MAGNUS Sandoval  Level of Service: [place] OFFICE/OUTPT VISIT, EST, LEVL III, 20-29 MIN    ASSESSMENT AND PLAN:      Problem List Items Addressed This Visit          ENT    Seasonal allergic rhinitis due to pollen - Primary    Current Assessment & Plan     Resume zyrtec 10 mg daily thru May, 2023  Then repeat zyrtec 10 mg daily September thru   Consider local honey 2 teaspoons daily to help reduce allergies--- be sure to change honey with seasons (light sweet honey is spring/summer and darker bitter honey is /winter)  Pillows and blankets not washed in washing machine, place in dryer and run on hot setting for 10 minutes once a week to reduce allergens on bed linens.   Also dust ceiling fans weekly or any other fan in bedroom  Vacuum or sweep and mop bedroom floor every 3-4 days to help reduce allergens             Relevant Medications    methylPREDNISolone (MEDROL DOSEPACK) 4 mg tablet    Acute bacterial pharyngitis    Current Assessment & Plan     Doxycycline 100 mg twice a day for 10 days  Medrol dose krupa to help with sinus pressure/AR symptoms and nasal congestion  Gargle with warm salt water often as tolerated to help with sore throat  Drink cool fluids with water being the best to help with throat pain and staying hydrated  To help with fever greater than 100: Tylenol or Ibuprofen as directed with dose approximate for weight and age  Tylenol maybe administered every four hours and ibuprofen maybe administered every 6 hours  Complete the entire course of oral antibiotic ordered for you (only stop antibiotic if allergy occurs)  If no improvement within 3 days, return to clinic for recheck     Recommend F/U with PCP regarding blood pressure         Relevant Medications    doxycycline  (VIBRAMYCIN) 100 MG Cap       Health Maintenance Topics with due status: Not Due       Topic Last Completion Date    Cervical Cancer Screening 07/26/2021     No future appointments.     Follow up if symptoms worsen or fail to improve. or sooner as needed.      CHIEF COMPLAINT: Nasal Congestion (Pt is here after two week with nasal congestion. She was seen by us two weeks ago and we gave her a decadron injection and a steroid pack. She is still having congestion and scratchy throat. )           HISTORY OF PRESENT ILLNESS:  Dimple Reinoso is a 37 y.o. female who presents to the clinic today     Dimple Reinoso presents to the clinic with Nasal Congestion (Pt is here after two week with nasal congestion. She was seen by us two weeks ago and we gave her a decadron injection and a steroid pack. She is still having congestion and scratchy throat. )     Seen on 4/29/2023 and treated with decadron/ zpak/ alavert D 12 hour and mucinex for seasonal AR and viral pharyngitis after being in the weather on 4/27/2023  Patient returned to day with same symptoms  Reports right side of throat only hurts at night  Reports changing toothbrush with last treatment    Reviewed BP: needs to lower salt intake/walk for exercise  Monitor blood pressure with goal 130/80 or less      Sore Throat   This is a chronic problem. The current episode started 1 to 4 weeks ago (started 4/29/2023). The problem has been waxing and waning. The pain is worse on the right side. There has been no fever. The pain is at a severity of 5/10. The pain is moderate. Associated symptoms include congestion. She has had no exposure to strep or mono. She has tried cool liquids and acetaminophen for the symptoms. The treatment provided significant relief.     PAST MEDICAL HISTORY:      Past Medical History:   Diagnosis Date    Anemia     Bilateral lower extremity edema     Hypertension     Morbid obesity     Viral pharyngitis 4/29/2023     PAST SURGICAL HISTORY:  Past  Surgical History:   Procedure Laterality Date     SECTION  2017    CHOLECYSTECTOMY  2004    REDUCTION OF BOTH BREASTS Bilateral 2006     SOCIAL HISTORY:  Social History     Socioeconomic History    Marital status:      Spouse name: Kem    Number of children: 3   Tobacco Use    Smoking status: Never     Passive exposure: Never    Smokeless tobacco: Never   Substance and Sexual Activity    Alcohol use: Yes     Comment: glass of wine maybe twice a month     Drug use: Never    Sexual activity: Yes     Partners: Male     Birth control/protection: None     FAMILY HISTORY:       Family History   Problem Relation Age of Onset    Hypertension Father     Breast cancer Maternal Grandmother     Diabetes Maternal Grandmother     No Known Problems Maternal Grandfather     Cancer Paternal Grandfather        ALLERGIES AND MEDICATIONS: updated and reviewed.       Review of patient's allergies indicates:  No Known Allergies  Medication List with Changes/Refills   New Medications    DOXYCYCLINE (VIBRAMYCIN) 100 MG CAP    Take 1 capsule (100 mg total) by mouth 2 (two) times daily. for 10 days    METHYLPREDNISOLONE (MEDROL DOSEPACK) 4 MG TABLET    use as directed   Current Medications    FERROUS SULFATE (FEOSOL) 325 MG (65 MG IRON) TAB TABLET    Take 325 mg by mouth once daily.    HYDROCHLOROTHIAZIDE (HYDRODIURIL) 25 MG TABLET    Take 25 mg by mouth once daily.    PHENTERMINE (ADIPEX-P) 37.5 MG TABLET    Take 37.5 mg by mouth once daily.   Discontinued Medications    MOMETASONE (NASONEX) 50 MCG/ACTUATION NASAL SPRAY    2 sprays by Nasal route once daily.       SCREENING HISTORY:     Health Maintenance         Date Due Completion Date    Hepatitis C Screening Never done ---    COVID-19 Vaccine (1) Never done ---    HIV Screening Never done ---    TETANUS VACCINE Never done ---    Hemoglobin A1c (Diabetic Prevention Screening) Never done ---    Cervical Cancer Screening 2024            REVIEW OF SYSTEMS:  "  Review of Systems   Constitutional: Negative.    HENT:  Positive for nasal congestion and sore throat.    Respiratory: Negative.     Cardiovascular: Negative.    Gastrointestinal: Negative.        PHYSICAL EXAM:         BP (!) 150/94 (BP Location: Left arm, Patient Position: Sitting, BP Method: Large (Automatic))   Pulse 76   Temp 98 °F (36.7 °C) (Oral)   Resp 14   Ht 5' 6" (1.676 m)   Wt (!) 143.6 kg (316 lb 9.6 oz)   LMP 05/12/2023 (Approximate)   SpO2 100%   BMI 51.10 kg/m²  Patient's last menstrual period was 05/12/2023 (approximate).  Wt Readings from Last 3 Encounters:   05/12/23 (!) 143.6 kg (316 lb 9.6 oz)   04/29/23 (!) 144.7 kg (319 lb)   04/20/23 (!) 142.9 kg (315 lb)     BP Readings from Last 3 Encounters:   05/12/23 (!) 150/94   04/29/23 (!) 136/93   04/20/23 129/89     Estimated body mass index is 51.1 kg/m² as calculated from the following:    Height as of this encounter: 5' 6" (1.676 m).    Weight as of this encounter: 143.6 kg (316 lb 9.6 oz).     Physical Exam  Constitutional:       Appearance: She is obese.      Comments: Weight down 3 lbs since 4/2023 visit   Cardiovascular:      Rate and Rhythm: Normal rate and regular rhythm.      Pulses: Normal pulses.   Pulmonary:      Effort: Pulmonary effort is normal.      Breath sounds: Normal breath sounds.   Neurological:      Mental Status: She is alert and oriented to person, place, and time.   Psychiatric:         Mood and Affect: Mood normal.         Behavior: Behavior normal.       LABS:     I have reviewed old labs below:  Lab Results   Component Value Date    WBC 7.73 02/21/2022    HGB 11.2 (L) 02/21/2022    HCT 37.7 (L) 02/21/2022    MCV 77.1 (L) 02/21/2022     02/21/2022     05/05/2021    K 3.8 05/05/2021     05/05/2021    CALCIUM 8.6 05/05/2021    CO2 27 05/05/2021    GLU 75 05/05/2021    BUN 15 05/05/2021    CREATININE 0.72 05/05/2021    ANIONGAP 11 05/05/2021    ESTGFRAFRICA 117 05/05/2021    PROT 7.6 05/05/2021 "    ALBUMIN 3.6 05/05/2021    BILITOT 0.4 05/05/2021    ALKPHOS 104 (H) 05/05/2021    ALT 18 05/05/2021    AST 9 (L) 05/05/2021    CHOL 126 05/05/2021    TRIG 115 05/05/2021    HDL 43 05/05/2021    LDLCALC 60 05/05/2021    TSH 2.150 05/05/2021           Signature:  LAKEISHA Sandoval[address] [place] [address]  Phone #: [phone]  Fax #: [phone]       Date of encounter: 5/12/23    There are no Patient Instructions on file for this visit.

## 2023-07-04 ENCOUNTER — OFFICE VISIT (OUTPATIENT)
Dept: FAMILY MEDICINE | Facility: CLINIC | Age: 38
End: 2023-07-04
Payer: COMMERCIAL

## 2023-07-04 VITALS
RESPIRATION RATE: 18 BRPM | WEIGHT: 293 LBS | TEMPERATURE: 99 F | OXYGEN SATURATION: 94 % | BODY MASS INDEX: 45.99 KG/M2 | HEIGHT: 67 IN | SYSTOLIC BLOOD PRESSURE: 138 MMHG | DIASTOLIC BLOOD PRESSURE: 88 MMHG | HEART RATE: 83 BPM

## 2023-07-04 DIAGNOSIS — J32.4 CHRONIC PANSINUSITIS: Primary | ICD-10-CM

## 2023-07-04 DIAGNOSIS — I10 ESSENTIAL HYPERTENSION: Chronic | ICD-10-CM

## 2023-07-04 PROCEDURE — 3075F PR MOST RECENT SYSTOLIC BLOOD PRESS GE 130-139MM HG: ICD-10-PCS | Mod: CPTII,,, | Performed by: NURSE PRACTITIONER

## 2023-07-04 PROCEDURE — 99214 OFFICE O/P EST MOD 30 MIN: CPT | Mod: ,,, | Performed by: NURSE PRACTITIONER

## 2023-07-04 PROCEDURE — 1159F PR MEDICATION LIST DOCUMENTED IN MEDICAL RECORD: ICD-10-PCS | Mod: CPTII,,, | Performed by: NURSE PRACTITIONER

## 2023-07-04 PROCEDURE — 4010F PR ACE/ARB THEARPY RXD/TAKEN: ICD-10-PCS | Mod: CPTII,,, | Performed by: NURSE PRACTITIONER

## 2023-07-04 PROCEDURE — 3008F PR BODY MASS INDEX (BMI) DOCUMENTED: ICD-10-PCS | Mod: CPTII,,, | Performed by: NURSE PRACTITIONER

## 2023-07-04 PROCEDURE — 3075F SYST BP GE 130 - 139MM HG: CPT | Mod: CPTII,,, | Performed by: NURSE PRACTITIONER

## 2023-07-04 PROCEDURE — 3008F BODY MASS INDEX DOCD: CPT | Mod: CPTII,,, | Performed by: NURSE PRACTITIONER

## 2023-07-04 PROCEDURE — 1159F MED LIST DOCD IN RCRD: CPT | Mod: CPTII,,, | Performed by: NURSE PRACTITIONER

## 2023-07-04 PROCEDURE — 3079F DIAST BP 80-89 MM HG: CPT | Mod: CPTII,,, | Performed by: NURSE PRACTITIONER

## 2023-07-04 PROCEDURE — 3079F PR MOST RECENT DIASTOLIC BLOOD PRESSURE 80-89 MM HG: ICD-10-PCS | Mod: CPTII,,, | Performed by: NURSE PRACTITIONER

## 2023-07-04 PROCEDURE — 4010F ACE/ARB THERAPY RXD/TAKEN: CPT | Mod: CPTII,,, | Performed by: NURSE PRACTITIONER

## 2023-07-04 PROCEDURE — 99214 PR OFFICE/OUTPT VISIT, EST, LEVL IV, 30-39 MIN: ICD-10-PCS | Mod: ,,, | Performed by: NURSE PRACTITIONER

## 2023-07-04 RX ORDER — PROMETHAZINE HYDROCHLORIDE AND DEXTROMETHORPHAN HYDROBROMIDE 6.25; 15 MG/5ML; MG/5ML
5 SYRUP ORAL EVERY 4 HOURS PRN
Qty: 120 ML | Refills: 0 | Status: SHIPPED | OUTPATIENT
Start: 2023-07-04 | End: 2023-08-01 | Stop reason: ALTCHOICE

## 2023-07-04 RX ORDER — LOSARTAN POTASSIUM 25 MG/1
25 TABLET ORAL DAILY
Qty: 90 TABLET | Refills: 0 | Status: SHIPPED | OUTPATIENT
Start: 2023-07-04 | End: 2023-10-02

## 2023-07-04 RX ORDER — GUAIFENESIN, PSEUDOEPHEDRINE HYDROCHLORIDE 600; 60 MG/1; MG/1
1 TABLET, EXTENDED RELEASE ORAL 2 TIMES DAILY PRN
Qty: 24 TABLET | Refills: 0 | Status: SHIPPED | OUTPATIENT
Start: 2023-07-04 | End: 2023-08-01 | Stop reason: ALTCHOICE

## 2023-07-04 NOTE — ASSESSMENT & PLAN NOTE
Monitor blood pressure with goal 120/80 or less  Low salt diet reviewed  Bring blood pressure readings to next visit  Recommend: losartan 25 mg daily   Return in 4 weeks for recheck

## 2023-07-04 NOTE — PROGRESS NOTES
MAGNUS Sandoval    [address] [place] [address]     PATIENT NAME: Dimple Reinoso  : 1985  DATE: 23  MRN: [id number]      Billing Provider: MAGNUS Sandoval  Level of Service: CT OFFICE/OUTPT VISIT, EST, LEVL IV, 30-39 MIN    ASSESSMENT AND PLAN:      Problem List Items Addressed This Visit          ENT    Seasonal allergic rhinitis due to pollen - Primary    Current Assessment & Plan     Mucinex D 12 hour one pill by mouth twice a day for congestion  Use Afrin nasal spray once daily for three days only for nasal congestion  Continue zyrtec 10 mg daily for allergies  Consider local honey 2 teaspoons daily to help reduce allergies--- be sure to change honey with seasons (light sweet honey is spring/summer and darker bitter honey is fall/winter)  Pillows and blankets not washed in washing machine, place in dryer and run on hot setting for 10 minutes once a week to reduce allergens on bed linens.   Also dust ceiling fans weekly or any other fan in bedroom  Vacuum or sweep and mop bedroom floor every 3-4 days to help reduce allergens    Blow nose often  Stay well hydrated with water being best  Use [place] pot or Saline nasal wash often to help remove nasal secretion and reduce swelling of nares  Complete oral antibiotics if ordered  If not improving after 4 days, return to clinic for recheck           Relevant Medications    pseudoephedrine-guaiFENesin  mg (MUCINEX D)  mg per tablet       Cardiac/Vascular    Essential hypertension (Chronic)    Overview     Blood pressure not clearly controlled, will monitor and record bp AM and PM for review at next office visit         Current Assessment & Plan     Monitor blood pressure with goal 120/80 or less  Low salt diet reviewed  Bring blood pressure readings to next visit  Recommend: losartan 25 mg daily   Return in 4 weeks for recheck         Relevant Medications    losartan (COZAAR) 25 MG  tablet       Health Maintenance Topics with due status: Not Due       Topic Last Completion Date    Cervical Cancer Screening 07/26/2021    Influenza Vaccine 04/20/2023     No future appointments.   Follow up in about 4 weeks (around 8/1/2023) for recheck. or sooner as needed.      CHIEF COMPLAINT: Sinusitis and Cough (Patient stated she went to a convention and it was too cold in the building and heat advisory outside and she was in and out getting too hot and too cold and has developed sinus congestion, sinus pressure, sinus headache and a cough. Stated her symptoms started on Sunday. Has taken OTC Mucinex, Ed-A-Hist from previous illness, Zyrtec, and Motrin for sinus pressure without good results. Stated she and her family are leaving to go on vacation on Thursday and needs something for her cold and cough. )           HISTORY OF PRESENT ILLNESS:  Dimple Reinoso is a 38 y.o. female who presents to the clinic today     Dimple Reinoso presents to the clinic with Sinusitis and Cough (Patient stated she went to a convention and it was too cold in the building and heat advisory outside and she was in and out getting too hot and too cold and has developed sinus congestion, sinus pressure, sinus headache and a cough. Stated her symptoms started on Sunday. Has taken OTC Mucinex, Ed-A-Hist from previous illness, Zyrtec, and Motrin for sinus pressure without good results. Stated she and her family are leaving to go on vacation on Thursday and needs something for her cold and cough. )     Reports negative covid test while at convention  Off BP med (HCTZ for a year per patient)  Recent treatment of sinusitis with doxycycline and Medrol dose krupa with improvement  Reports after traveling, sinus congestion worse  Ed A Hist not helping/ zyrtec not helping/ mucinex DM not working  Request injection today  Discussed recurrent use of steroid not a good option today  No fever reported      Sinusitis  This is a recurrent problem.  The current episode started in the past 7 days. The problem is unchanged. There has been no fever. Her pain is at a severity of 4/10. The pain is moderate. Associated symptoms include coughing, sinus pressure and a sore throat. Past treatments include oral decongestants. The treatment provided no relief.   Cough  This is a new problem. The current episode started in the past 7 days. The problem has been waxing and waning. The problem occurs every few minutes. Cough characteristics: reports productive cough in the morning/nonproductive during day/ reports nasal congestion with inability tobreath from nares. Associated symptoms include nasal congestion, postnasal drip and a sore throat. The symptoms are aggravated by lying down and pollens. Risk factors for lung disease include travel. She has tried OTC cough suppressant for the symptoms. The treatment provided mild relief. Her past medical history is significant for environmental allergies.     PAST MEDICAL HISTORY:      Past Medical History:   Diagnosis Date    Anemia     Bilateral lower extremity edema     Hypertension     Morbid obesity     Viral pharyngitis 2023     PAST SURGICAL HISTORY:  Past Surgical History:   Procedure Laterality Date     SECTION  2017    CHOLECYSTECTOMY  2004    REDUCTION OF BOTH BREASTS Bilateral 2006     SOCIAL HISTORY:  Social History     Socioeconomic History    Marital status:      Spouse name: Kem    Number of children: 3   Tobacco Use    Smoking status: Never     Passive exposure: Never    Smokeless tobacco: Never   Substance and Sexual Activity    Alcohol use: Yes     Comment: glass of wine maybe twice a month     Drug use: Never    Sexual activity: Yes     Partners: Male     Birth control/protection: None     FAMILY HISTORY:       Family History   Problem Relation Age of Onset    Hypertension Father     Breast cancer Maternal Grandmother     Diabetes Maternal Grandmother     No Known Problems Maternal  "Grandfather     Cancer Paternal Grandfather        ALLERGIES AND MEDICATIONS: updated and reviewed.       Review of patient's allergies indicates:  No Known Allergies  Medication List with Changes/Refills   New Medications    LOSARTAN (COZAAR) 25 MG TABLET    Take 1 tablet (25 mg total) by mouth once daily.    PROMETHAZINE-DEXTROMETHORPHAN (PROMETHAZINE-DM) 6.25-15 MG/5 ML SYRP    Take 5 mLs by mouth every 4 (four) hours as needed (cough).    PSEUDOEPHEDRINE-GUAIFENESIN  MG (MUCINEX D)  MG PER TABLET    Take 1 tablet by mouth 2 (two) times daily as needed for Congestion.   Discontinued Medications    FERROUS SULFATE (FEOSOL) 325 MG (65 MG IRON) TAB TABLET    Take 325 mg by mouth once daily.    HYDROCHLOROTHIAZIDE (HYDRODIURIL) 25 MG TABLET    Take 25 mg by mouth once daily.    PHENTERMINE (ADIPEX-P) 37.5 MG TABLET    Take 37.5 mg by mouth once daily.       SCREENING HISTORY:     Health Maintenance         Date Due Completion Date    Hepatitis C Screening Never done ---    COVID-19 Vaccine (1) Never done ---    HIV Screening Never done ---    TETANUS VACCINE Never done ---    Mammogram Never done ---    Hemoglobin A1c (Diabetic Prevention Screening) Never done ---    Influenza Vaccine (1) 09/01/2023 4/20/2023 (Declined)    Override on 4/20/2023: Declined    Cervical Cancer Screening 07/26/2024 7/26/2021            REVIEW OF SYSTEMS:   Review of Systems   Constitutional: Negative.    HENT:  Positive for postnasal drip, sinus pressure/congestion and sore throat.    Respiratory:  Positive for cough.    Cardiovascular: Negative.    Gastrointestinal: Negative.    Genitourinary: Negative.    Allergic/Immunologic: Positive for environmental allergies.       PHYSICAL EXAM:         /88 (BP Location: Right arm, Patient Position: Sitting, BP Method: Large (Manual))   Pulse 83   Temp 98.8 °F (37.1 °C) (Oral)   Resp 18   Ht 5' 6.5" (1.689 m)   Wt (!) 142.9 kg (315 lb)   LMP 06/07/2023   SpO2 (!) 94%   " "BMI 50.08 kg/m²  Patient's last menstrual period was 06/07/2023.  Wt Readings from Last 3 Encounters:   07/04/23 (!) 142.9 kg (315 lb)   05/12/23 (!) 143.6 kg (316 lb 9.6 oz)   04/29/23 (!) 144.7 kg (319 lb)     BP Readings from Last 3 Encounters:   07/04/23 138/88   05/12/23 (!) 150/94   04/29/23 (!) 136/93     Estimated body mass index is 50.08 kg/m² as calculated from the following:    Height as of this encounter: 5' 6.5" (1.689 m).    Weight as of this encounter: 142.9 kg (315 lb).     Physical Exam  Constitutional:       Appearance: She is obese.   HENT:      Right Ear: A middle ear effusion is present.      Left Ear: A middle ear effusion is present.      Nose: Mucosal edema present.      Right Turbinates: Swollen and pale.      Left Turbinates: Swollen and pale.      Mouth/Throat:      Lips: Pink.      Pharynx: Oropharynx is clear. No posterior oropharyngeal erythema.      Tonsils: 2+ on the right. 3+ on the left.   Cardiovascular:      Rate and Rhythm: Normal rate and regular rhythm.      Pulses: Normal pulses.      Heart sounds: Normal heart sounds.   Pulmonary:      Effort: Pulmonary effort is normal.      Breath sounds: Normal breath sounds.   Musculoskeletal:      Cervical back: Normal range of motion and neck supple.   Neurological:      Mental Status: She is alert.       LABS:     I have reviewed old labs below:  Lab Results   Component Value Date    WBC 7.73 02/21/2022    HGB 11.2 (L) 02/21/2022    HCT 37.7 (L) 02/21/2022    MCV 77.1 (L) 02/21/2022     02/21/2022     05/05/2021    K 3.8 05/05/2021     05/05/2021    CALCIUM 8.6 05/05/2021    CO2 27 05/05/2021    GLU 75 05/05/2021    BUN 15 05/05/2021    CREATININE 0.72 05/05/2021    ANIONGAP 11 05/05/2021    ESTGFRAFRICA 117 05/05/2021    PROT 7.6 05/05/2021    ALBUMIN 3.6 05/05/2021    BILITOT 0.4 05/05/2021    ALKPHOS 104 (H) 05/05/2021    ALT 18 05/05/2021    AST 9 (L) 05/05/2021    CHOL 126 05/05/2021    TRIG 115 05/05/2021    " HDL 43 05/05/2021    LDLCALC 60 05/05/2021    TSH 2.150 05/05/2021           Signature:  MAGNUS Sandoval  [address] [place] [address]  Phone #: [phone]  Fax #: [phone]       Date of encounter: 7/4/23    Patient Instructions   Consider local honey 2 teaspoons daily to help reduce allergies--- be sure to change honey with seasons (light sweet honey is spring/summer and darker bitter honey is fall/winter)  Pillows and blankets not washed in washing machine, place in dryer and run on hot setting for 10 minutes once a week to reduce allergens on bed linens.   Also dust ceiling fans weekly or any other fan in bedroom  Vacuum or sweep and mop bedroom floor every 3-4 days to help reduce allergens   Blow nose often  Stay well hydrated with water being best  Use [place] pot or Saline nasal wash often to help remove nasal secretion and reduce swelling of nares  Complete oral antibiotics if ordered  If not improving after 4 days, return to clinic for recheck  Mucinex D 12 hour one pill twice a day for sinus congestion  Use Afrin nasal spray once daily for three days only  Continue zyrtec 10 mg daily   Monitor blood pressure with goal 120/80 or less  Low salt diet  Bring blood pressure readings to next visit for recheck

## 2023-07-04 NOTE — ASSESSMENT & PLAN NOTE
Mucinex D 12 hour one pill by mouth twice a day for congestion  Use Afrin nasal spray once daily for three days only for nasal congestion  Continue zyrtec 10 mg daily for allergies  Consider local honey 2 teaspoons daily to help reduce allergies--- be sure to change honey with seasons (light sweet honey is spring/summer and darker bitter honey is fall/winter)  Pillows and blankets not washed in washing machine, place in dryer and run on hot setting for 10 minutes once a week to reduce allergens on bed linens.   Also dust ceiling fans weekly or any other fan in bedroom  Vacuum or sweep and mop bedroom floor every 3-4 days to help reduce allergens    Blow nose often  Stay well hydrated with water being best  Use [place] pot or Saline nasal wash often to help remove nasal secretion and reduce swelling of nares  Complete oral antibiotics if ordered  If not improving after 4 days, return to clinic for recheck

## 2023-07-04 NOTE — PATIENT INSTRUCTIONS
Consider local honey 2 teaspoons daily to help reduce allergies--- be sure to change honey with seasons (light sweet honey is spring/summer and darker bitter honey is fall/winter)  Pillows and blankets not washed in washing machine, place in dryer and run on hot setting for 10 minutes once a week to reduce allergens on bed linens.   Also dust ceiling fans weekly or any other fan in bedroom  Vacuum or sweep and mop bedroom floor every 3-4 days to help reduce allergens   Blow nose often  Stay well hydrated with water being best  Use [place] pot or Saline nasal wash often to help remove nasal secretion and reduce swelling of nares  Complete oral antibiotics if ordered  If not improving after 4 days, return to clinic for recheck  Mucinex D 12 hour one pill twice a day for sinus congestion  Use Afrin nasal spray once daily for three days only  Continue zyrtec 10 mg daily   Monitor blood pressure with goal 120/80 or less  Low salt diet  Bring blood pressure readings to next visit for recheck

## 2023-07-19 ENCOUNTER — OFFICE VISIT (OUTPATIENT)
Dept: OBSTETRICS AND GYNECOLOGY | Facility: CLINIC | Age: 38
End: 2023-07-19
Payer: COMMERCIAL

## 2023-07-19 VITALS
BODY MASS INDEX: 47.09 KG/M2 | SYSTOLIC BLOOD PRESSURE: 138 MMHG | WEIGHT: 293 LBS | DIASTOLIC BLOOD PRESSURE: 80 MMHG | HEIGHT: 66 IN

## 2023-07-19 DIAGNOSIS — Z01.419 WOMEN'S ANNUAL ROUTINE GYNECOLOGICAL EXAMINATION: Primary | ICD-10-CM

## 2023-07-19 DIAGNOSIS — Z12.4 ENCOUNTER FOR PAPANICOLAOU SMEAR FOR CERVICAL CANCER SCREENING: ICD-10-CM

## 2023-07-19 PROCEDURE — 3008F BODY MASS INDEX DOCD: CPT | Mod: CPTII,,, | Performed by: OBSTETRICS & GYNECOLOGY

## 2023-07-19 PROCEDURE — 3008F PR BODY MASS INDEX (BMI) DOCUMENTED: ICD-10-PCS | Mod: CPTII,,, | Performed by: OBSTETRICS & GYNECOLOGY

## 2023-07-19 PROCEDURE — 99396 PREV VISIT EST AGE 40-64: CPT | Mod: S$PBB,,, | Performed by: OBSTETRICS & GYNECOLOGY

## 2023-07-19 PROCEDURE — 99396 PR PREVENTIVE VISIT,EST,40-64: ICD-10-PCS | Mod: S$PBB,,, | Performed by: OBSTETRICS & GYNECOLOGY

## 2023-07-19 PROCEDURE — 99213 OFFICE O/P EST LOW 20 MIN: CPT | Mod: PBBFAC | Performed by: OBSTETRICS & GYNECOLOGY

## 2023-07-19 PROCEDURE — 3075F SYST BP GE 130 - 139MM HG: CPT | Mod: CPTII,,, | Performed by: OBSTETRICS & GYNECOLOGY

## 2023-07-19 PROCEDURE — 3075F PR MOST RECENT SYSTOLIC BLOOD PRESS GE 130-139MM HG: ICD-10-PCS | Mod: CPTII,,, | Performed by: OBSTETRICS & GYNECOLOGY

## 2023-07-19 PROCEDURE — 1159F PR MEDICATION LIST DOCUMENTED IN MEDICAL RECORD: ICD-10-PCS | Mod: CPTII,,, | Performed by: OBSTETRICS & GYNECOLOGY

## 2023-07-19 PROCEDURE — 88142 CYTOPATH C/V THIN LAYER: CPT | Mod: TC,GCY | Performed by: OBSTETRICS & GYNECOLOGY

## 2023-07-19 PROCEDURE — 87624 HPV HI-RISK TYP POOLED RSLT: CPT | Mod: ,,, | Performed by: CLINICAL MEDICAL LABORATORY

## 2023-07-19 PROCEDURE — 87624 HUMAN PAPILLOMAVIRUS (HPV): ICD-10-PCS | Mod: ,,, | Performed by: CLINICAL MEDICAL LABORATORY

## 2023-07-19 PROCEDURE — 4010F ACE/ARB THERAPY RXD/TAKEN: CPT | Mod: CPTII,,, | Performed by: OBSTETRICS & GYNECOLOGY

## 2023-07-19 PROCEDURE — 4010F PR ACE/ARB THEARPY RXD/TAKEN: ICD-10-PCS | Mod: CPTII,,, | Performed by: OBSTETRICS & GYNECOLOGY

## 2023-07-19 PROCEDURE — 3079F PR MOST RECENT DIASTOLIC BLOOD PRESSURE 80-89 MM HG: ICD-10-PCS | Mod: CPTII,,, | Performed by: OBSTETRICS & GYNECOLOGY

## 2023-07-19 PROCEDURE — 3079F DIAST BP 80-89 MM HG: CPT | Mod: CPTII,,, | Performed by: OBSTETRICS & GYNECOLOGY

## 2023-07-19 PROCEDURE — 1159F MED LIST DOCD IN RCRD: CPT | Mod: CPTII,,, | Performed by: OBSTETRICS & GYNECOLOGY

## 2023-07-19 NOTE — PROGRESS NOTES
Subjective:       Patient ID: Dimple Reinoso is a 38 y.o. female.    Chief Complaint: Annual Exam (Here for check up and pap-pt has nexplanon in and needs to know how long its been in , unsure of insertion date. )    Presents new to my practice.    Patient has Nexplanon placed September 2021.  We have discussed replacing in 1 year.      She has monthly menses very light.  We discussed if she misses menses any signs of pregnancy to perform home pregnancy test.      Review of Systems      Objective:      Physical Exam  Exam conducted with a chaperone present.   HENT:      Head: Normocephalic.      Nose: Nose normal.   Eyes:      Pupils: Pupils are equal, round, and reactive to light.   Cardiovascular:      Rate and Rhythm: Normal rate.   Pulmonary:      Effort: Pulmonary effort is normal.      Breath sounds: Normal breath sounds.   Chest:      Comments: Breasts without palpable masses no skin retraction or nipple dimpling.  She has had reduction with excellent cosmetic outcome.    She was given scheduling letter.  She will self schedule mammography screening.  Abdominal:      General: Abdomen is flat.      Palpations: Abdomen is soft.   Genitourinary:     General: Normal vulva.      Vagina: Normal.      Cervix: Normal.      Uterus: Normal.       Adnexa: Right adnexa normal and left adnexa normal.      Rectum: Normal.   Musculoskeletal:         General: Normal range of motion.      Cervical back: Full passive range of motion without pain, normal range of motion and neck supple.   Skin:     General: Skin is dry.   Neurological:      General: No focal deficit present.      Mental Status: She is alert.   Psychiatric:         Attention and Perception: Attention normal.         Mood and Affect: Mood normal.       Assessment:       1. Women's annual routine gynecological examination    2. Encounter for Papanicolaou smear for cervical cancer screening        Plan:       Patient Instructions   Discussed normal gyn  examination.      Follow-up yearly.  We discussed yearly screening mammography.      Routine glucose cholesterol testing by primary care provider.      She will notify me if she is not heard from Pap test within 1 month.

## 2023-07-19 NOTE — PATIENT INSTRUCTIONS
Discussed normal gyn examination.      Follow-up yearly.  We discussed yearly screening mammography.      Routine glucose cholesterol testing by primary care provider.      She will notify me if she is not heard from Pap test within 1 month.

## 2023-07-21 LAB
HPV 16: NEGATIVE
HPV 18: NEGATIVE
HPV OTHER: NEGATIVE

## 2023-07-27 NOTE — PROGRESS NOTES
MAGNUS Sandoval    [address] [place] [address]     PATIENT NAME: Dimple Reinoso  : 1985  DATE: 23  MRN: [id number]      Billing Provider: MAGNUS Sandoval  Level of Service: LA OFFICE/OUTPT VISIT, EST, LEVL III, 20-29 MIN    ASSESSMENT AND PLAN:      Problem List Items Addressed This Visit          Cardiac/Vascular    Essential hypertension - Primary (Chronic)    Overview     Blood pressure not clearly controlled, will monitor and record bp AM and PM for review at next office visit         Current Assessment & Plan     Chronic recurrent problem  Not to goal   Add HCTZ 25 mg daily  Improving but needs to improve with goal blood pressure 130/80 or less  Reduce sodium/salt in your diet  If you need to season your food, use peppers or powders or other food for seasoning  Take your medication as prescribed each day  Monitor and record your blood pressure reading and bring results to each office visit  Goal blood pressure is 120/80 or less but not less than 90/60  Bring your medications to each office visit for review  Loosing 10 lbs will lower your blood pressure to a healthier level  This healthy change will lower your risk for heart attack, stroke, heart disease, and/or death  Your heart is a muscle and needs to be exercised each day to work its best  Maintaining a healthy blood pressure also protects your kidneys  Flushing your kidneys by drinking water may help   Return in 4 weeks for fasting lab: lipid/cmp/cbc/iron and TIBC with ferritin/ microalbumin              Health Maintenance Topics with due status: Not Due       Topic Last Completion Date    Influenza Vaccine 2023    Cervical Cancer Screening 2023     No future appointments.     Follow up in about 4 weeks (around 2023) for fasting for wellness lab and recheck BP with iron study recheck. or sooner as needed.      CHIEF COMPLAINT: Hypertension (4 week f/u since starting  on Losartan . She is tolerating new med well. Last week B/P was 138/80.) and Health Maintenance (Discussed care gaps. She will schedule mammo next week. )           HISTORY OF PRESENT ILLNESS:  Dimple Reinoso is a 38 y.o. female who presents to the clinic today     Dimple Reinoso presents to the clinic with Hypertension (4 week f/u since starting on Losartan . She is tolerating new med well. Last week B/P was 138/80.) and Health Maintenance (Discussed care gaps. She will schedule mammo next week. )     Seen 2023 treated for HTN: Monitor blood pressure with goal 120/80 or less  Low salt diet reviewed  Bring blood pressure readings to next visit  Recommend: losartan 25 mg daily     Here for recheck: not to goal  Hx of HCTZ use but patient had stopped for unknown reason  Pt drinking water and has changed diet to low salt          PAST MEDICAL HISTORY:      Past Medical History:   Diagnosis Date    Abnormal electrocardiogram (ECG) (EKG) 2021    Abnormal ekg, cannot rule out old ant MI, await results of stress imaging to eval for ischemic substrate    Acute bacterial pharyngitis 2023    Anemia     Bilateral lower extremity edema     Elevated BP without diagnosis of hypertension 2022    Hypertension     Morbid obesity     Sinus headache 2022    Viral pharyngitis 2023     PAST SURGICAL HISTORY:  Past Surgical History:   Procedure Laterality Date     SECTION  2017    CHOLECYSTECTOMY  2004    REDUCTION OF BOTH BREASTS Bilateral      SOCIAL HISTORY:  Social History     Socioeconomic History    Marital status:      Spouse name: Kem    Number of children: 3   Tobacco Use    Smoking status: Never     Passive exposure: Never    Smokeless tobacco: Never   Substance and Sexual Activity    Alcohol use: Yes     Comment: glass of wine maybe twice a month     Drug use: Never    Sexual activity: Yes     Partners: Male     Birth control/protection: None     FAMILY HISTORY:       Family  "History   Problem Relation Age of Onset    Hypertension Father     Breast cancer Maternal Grandmother     Diabetes Maternal Grandmother     No Known Problems Maternal Grandfather     Cancer Paternal Grandfather        ALLERGIES AND MEDICATIONS: updated and reviewed.       Review of patient's allergies indicates:  No Known Allergies  Medication List with Changes/Refills   New Medications    HYDROCHLOROTHIAZIDE (HYDRODIURIL) 25 MG TABLET    Take 1 tablet (25 mg total) by mouth once daily.   Current Medications    LOSARTAN (COZAAR) 25 MG TABLET    Take 1 tablet (25 mg total) by mouth once daily.   Discontinued Medications    METRONIDAZOLE (FLAGYL) 500 MG TABLET    Take 500 mg by mouth 2 (two) times daily.    PROMETHAZINE-DEXTROMETHORPHAN (PROMETHAZINE-DM) 6.25-15 MG/5 ML SYRP    Take 5 mLs by mouth every 4 (four) hours as needed (cough).    PSEUDOEPHEDRINE-GUAIFENESIN  MG (MUCINEX D)  MG PER TABLET    Take 1 tablet by mouth 2 (two) times daily as needed for Congestion.       SCREENING HISTORY:     Health Maintenance         Date Due Completion Date    Hepatitis C Screening Never done ---    COVID-19 Vaccine (1) Never done ---    HIV Screening Never done ---    TETANUS VACCINE Never done ---    Mammogram Never done ---    Hemoglobin A1c (Diabetic Prevention Screening) Never done ---    Influenza Vaccine (1) 09/01/2023 4/20/2023 (Declined)    Override on 4/20/2023: Declined    Cervical Cancer Screening 07/19/2028 7/19/2023            REVIEW OF SYSTEMS:   Review of Systems   Constitutional: Negative.    Respiratory: Negative.     Cardiovascular: Negative.    Genitourinary: Negative.          PHYSICAL EXAM:         BP (!) 148/82 (BP Location: Right arm, Patient Position: Sitting)   Pulse 75   Temp 98.1 °F (36.7 °C) (Oral)   Resp 18   Ht 5' 6" (1.676 m)   Wt (!) 141 kg (310 lb 12.8 oz)   LMP 06/15/2023 (Exact Date)   SpO2 99%   BMI 50.16 kg/m²  Patient's last menstrual period was 06/15/2023 (exact " "date).  Wt Readings from Last 3 Encounters:   08/01/23 (!) 141 kg (310 lb 12.8 oz)   07/19/23 (!) 141.1 kg (311 lb)   07/04/23 (!) 142.9 kg (315 lb)     BP Readings from Last 3 Encounters:   08/01/23 (!) 148/82   07/19/23 138/80   07/04/23 138/88     Estimated body mass index is 50.16 kg/m² as calculated from the following:    Height as of this encounter: 5' 6" (1.676 m).    Weight as of this encounter: 141 kg (310 lb 12.8 oz).     Physical Exam  Constitutional:       Comments: Weight down 1 lb since 7/19/2023   Cardiovascular:      Rate and Rhythm: Normal rate and regular rhythm.      Pulses: Normal pulses.   Pulmonary:      Effort: Pulmonary effort is normal.      Breath sounds: Normal breath sounds.   Abdominal:      Palpations: Abdomen is soft.   Musculoskeletal:      Cervical back: Neck supple.   Skin:     General: Skin is warm.   Neurological:      Mental Status: She is alert and oriented to person, place, and time.   Psychiatric:         Mood and Affect: Mood normal.         LABS:     I have reviewed old labs below:  Lab Results   Component Value Date    WBC 7.73 02/21/2022    HGB 11.2 (L) 02/21/2022    HCT 37.7 (L) 02/21/2022    MCV 77.1 (L) 02/21/2022     02/21/2022     05/05/2021    K 3.8 05/05/2021     05/05/2021    CALCIUM 8.6 05/05/2021    CO2 27 05/05/2021    GLU 75 05/05/2021    BUN 15 05/05/2021    CREATININE 0.72 05/05/2021    ANIONGAP 11 05/05/2021    ESTGFRAFRICA 117 05/05/2021    PROT 7.6 05/05/2021    ALBUMIN 3.6 05/05/2021    BILITOT 0.4 05/05/2021    ALKPHOS 104 (H) 05/05/2021    ALT 18 05/05/2021    AST 9 (L) 05/05/2021    CHOL 126 05/05/2021    TRIG 115 05/05/2021    HDL 43 05/05/2021    LDLCALC 60 05/05/2021    TSH 2.150 05/05/2021           Signature:  MAGNUS Sandoval  [address] [place] [address]  Phone #: [phone]  Fax #: [phone]       Date of encounter: 8/1/23    Patient Instructions   Chronic recurrent " problem  Not to goal   Improving but needs to improve with goal blood pressure 130/80 or less  Reduce sodium/salt in your diet  If you need to season your food, use peppers or powders or other food for seasoning  Take your medication as prescribed each day  Monitor and record your blood pressure reading and bring results to each office visit  Goal blood pressure is 120/80 or less but not less than 90/60  Bring your medications to each office visit for review  Loosing 10 lbs will lower your blood pressure to a healthier level  This healthy change will lower your risk for heart attack, stroke, heart disease, and/or death  Your heart is a muscle and needs to be exercised each day to work its best  Maintaining a healthy blood pressure also protects your kidneys  Flushing your kidneys by drinking water may help

## 2023-08-01 ENCOUNTER — OFFICE VISIT (OUTPATIENT)
Dept: FAMILY MEDICINE | Facility: CLINIC | Age: 38
End: 2023-08-01
Payer: COMMERCIAL

## 2023-08-01 VITALS
HEART RATE: 75 BPM | HEIGHT: 66 IN | OXYGEN SATURATION: 99 % | DIASTOLIC BLOOD PRESSURE: 82 MMHG | RESPIRATION RATE: 18 BRPM | TEMPERATURE: 98 F | WEIGHT: 293 LBS | SYSTOLIC BLOOD PRESSURE: 148 MMHG | BODY MASS INDEX: 47.09 KG/M2

## 2023-08-01 DIAGNOSIS — I10 ESSENTIAL HYPERTENSION: Primary | Chronic | ICD-10-CM

## 2023-08-01 PROBLEM — J02.8 ACUTE BACTERIAL PHARYNGITIS: Status: RESOLVED | Noted: 2023-05-12 | Resolved: 2023-08-01

## 2023-08-01 PROBLEM — R51.9 SINUS HEADACHE: Status: RESOLVED | Noted: 2022-02-21 | Resolved: 2023-08-01

## 2023-08-01 PROBLEM — B96.89 ACUTE BACTERIAL PHARYNGITIS: Status: RESOLVED | Noted: 2023-05-12 | Resolved: 2023-08-01

## 2023-08-01 PROBLEM — R94.31 ABNORMAL ELECTROCARDIOGRAM (ECG) (EKG): Status: RESOLVED | Noted: 2021-06-27 | Resolved: 2023-08-01

## 2023-08-01 PROBLEM — R03.0 ELEVATED BP WITHOUT DIAGNOSIS OF HYPERTENSION: Status: RESOLVED | Noted: 2022-02-21 | Resolved: 2023-08-01

## 2023-08-01 PROCEDURE — 99213 PR OFFICE/OUTPT VISIT, EST, LEVL III, 20-29 MIN: ICD-10-PCS | Mod: ,,, | Performed by: NURSE PRACTITIONER

## 2023-08-01 PROCEDURE — 3077F PR MOST RECENT SYSTOLIC BLOOD PRESSURE >= 140 MM HG: ICD-10-PCS | Mod: CPTII,,, | Performed by: NURSE PRACTITIONER

## 2023-08-01 PROCEDURE — 3008F PR BODY MASS INDEX (BMI) DOCUMENTED: ICD-10-PCS | Mod: CPTII,,, | Performed by: NURSE PRACTITIONER

## 2023-08-01 PROCEDURE — 3008F BODY MASS INDEX DOCD: CPT | Mod: CPTII,,, | Performed by: NURSE PRACTITIONER

## 2023-08-01 PROCEDURE — 3077F SYST BP >= 140 MM HG: CPT | Mod: CPTII,,, | Performed by: NURSE PRACTITIONER

## 2023-08-01 PROCEDURE — 4010F ACE/ARB THERAPY RXD/TAKEN: CPT | Mod: CPTII,,, | Performed by: NURSE PRACTITIONER

## 2023-08-01 PROCEDURE — 3079F DIAST BP 80-89 MM HG: CPT | Mod: CPTII,,, | Performed by: NURSE PRACTITIONER

## 2023-08-01 PROCEDURE — 3079F PR MOST RECENT DIASTOLIC BLOOD PRESSURE 80-89 MM HG: ICD-10-PCS | Mod: CPTII,,, | Performed by: NURSE PRACTITIONER

## 2023-08-01 PROCEDURE — 4010F PR ACE/ARB THEARPY RXD/TAKEN: ICD-10-PCS | Mod: CPTII,,, | Performed by: NURSE PRACTITIONER

## 2023-08-01 PROCEDURE — 99213 OFFICE O/P EST LOW 20 MIN: CPT | Mod: ,,, | Performed by: NURSE PRACTITIONER

## 2023-08-01 PROCEDURE — 1159F PR MEDICATION LIST DOCUMENTED IN MEDICAL RECORD: ICD-10-PCS | Mod: CPTII,,, | Performed by: NURSE PRACTITIONER

## 2023-08-01 PROCEDURE — 1159F MED LIST DOCD IN RCRD: CPT | Mod: CPTII,,, | Performed by: NURSE PRACTITIONER

## 2023-08-01 RX ORDER — HYDROCHLOROTHIAZIDE 25 MG/1
25 TABLET ORAL DAILY
Qty: 90 TABLET | Refills: 1 | Status: SHIPPED | OUTPATIENT
Start: 2023-08-01 | End: 2024-04-03 | Stop reason: SDUPTHER

## 2023-08-01 RX ORDER — LOSARTAN POTASSIUM 25 MG/1
25 TABLET ORAL DAILY
Qty: 90 TABLET | Refills: 1 | Status: CANCELLED | OUTPATIENT
Start: 2023-08-01 | End: 2024-07-31

## 2023-08-01 RX ORDER — METRONIDAZOLE 500 MG/1
500 TABLET ORAL 2 TIMES DAILY
COMMUNITY
Start: 2023-07-29 | End: 2023-08-01 | Stop reason: ALTCHOICE

## 2023-08-01 NOTE — PATIENT INSTRUCTIONS
Chronic recurrent problem  Not to goal   Improving but needs to improve with goal blood pressure 130/80 or less  Reduce sodium/salt in your diet  If you need to season your food, use peppers or powders or other food for seasoning  Take your medication as prescribed each day  Monitor and record your blood pressure reading and bring results to each office visit  Goal blood pressure is 120/80 or less but not less than 90/60  Bring your medications to each office visit for review  Loosing 10 lbs will lower your blood pressure to a healthier level  This healthy change will lower your risk for heart attack, stroke, heart disease, and/or death  Your heart is a muscle and needs to be exercised each day to work its best  Maintaining a healthy blood pressure also protects your kidneys  Flushing your kidneys by drinking water may help

## 2023-08-01 NOTE — ASSESSMENT & PLAN NOTE
Chronic recurrent problem  Not to goal   Add HCTZ 25 mg daily  Improving but needs to improve with goal blood pressure 130/80 or less  Reduce sodium/salt in your diet  If you need to season your food, use peppers or powders or other food for seasoning  Take your medication as prescribed each day  Monitor and record your blood pressure reading and bring results to each office visit  Goal blood pressure is 120/80 or less but not less than 90/60  Bring your medications to each office visit for review  Loosing 10 lbs will lower your blood pressure to a healthier level  This healthy change will lower your risk for heart attack, stroke, heart disease, and/or death  Your heart is a muscle and needs to be exercised each day to work its best  Maintaining a healthy blood pressure also protects your kidneys  Flushing your kidneys by drinking water may help   Return in 4 weeks for fasting lab: lipid/cmp/cbc/iron and TIBC with ferritin/ microalbumin

## 2023-08-31 ENCOUNTER — OFFICE VISIT (OUTPATIENT)
Dept: FAMILY MEDICINE | Facility: CLINIC | Age: 38
End: 2023-08-31
Payer: COMMERCIAL

## 2023-08-31 VITALS
TEMPERATURE: 99 F | HEIGHT: 66 IN | RESPIRATION RATE: 16 BRPM | WEIGHT: 293 LBS | DIASTOLIC BLOOD PRESSURE: 79 MMHG | OXYGEN SATURATION: 100 % | HEART RATE: 72 BPM | BODY MASS INDEX: 47.09 KG/M2 | SYSTOLIC BLOOD PRESSURE: 115 MMHG

## 2023-08-31 DIAGNOSIS — Z13.220 ENCOUNTER FOR LIPID SCREENING FOR CARDIOVASCULAR DISEASE: ICD-10-CM

## 2023-08-31 DIAGNOSIS — Z13.6 ENCOUNTER FOR LIPID SCREENING FOR CARDIOVASCULAR DISEASE: ICD-10-CM

## 2023-08-31 DIAGNOSIS — Z12.31 SCREENING MAMMOGRAM FOR BREAST CANCER: ICD-10-CM

## 2023-08-31 DIAGNOSIS — Z00.00 ANNUAL PHYSICAL EXAM: Primary | ICD-10-CM

## 2023-08-31 DIAGNOSIS — D50.9 IRON DEFICIENCY ANEMIA, UNSPECIFIED IRON DEFICIENCY ANEMIA TYPE: ICD-10-CM

## 2023-08-31 DIAGNOSIS — Z13.1 SCREENING FOR DIABETES MELLITUS (DM): ICD-10-CM

## 2023-08-31 LAB
BASOPHILS # BLD AUTO: 0.04 K/UL (ref 0–0.2)
BASOPHILS NFR BLD AUTO: 0.6 % (ref 0–1)
CHOLEST SERPL-MCNC: 130 MG/DL (ref 0–200)
CHOLEST/HDLC SERPL: 3 {RATIO}
DIFFERENTIAL METHOD BLD: ABNORMAL
EOSINOPHIL # BLD AUTO: 0.17 K/UL (ref 0–0.5)
EOSINOPHIL NFR BLD AUTO: 2.7 % (ref 1–4)
ERYTHROCYTE [DISTWIDTH] IN BLOOD BY AUTOMATED COUNT: 15.9 % (ref 11.5–14.5)
FERRITIN SERPL-MCNC: 46 NG/ML (ref 8–252)
GLUCOSE SERPL-MCNC: 94 MG/DL (ref 74–106)
HCT VFR BLD AUTO: 42.5 % (ref 38–47)
HDLC SERPL-MCNC: 43 MG/DL (ref 40–60)
HGB BLD-MCNC: 12.9 G/DL (ref 12–16)
IMM GRANULOCYTES # BLD AUTO: 0.03 K/UL (ref 0–0.04)
IMM GRANULOCYTES NFR BLD: 0.5 % (ref 0–0.4)
IRON SATN MFR SERPL: 14 % (ref 14–50)
IRON SERPL-MCNC: 59 ΜG/DL (ref 50–170)
LDLC SERPL CALC-MCNC: 66 MG/DL
LDLC/HDLC SERPL: 1.5 {RATIO}
LYMPHOCYTES # BLD AUTO: 1.87 K/UL (ref 1–4.8)
LYMPHOCYTES NFR BLD AUTO: 30.1 % (ref 27–41)
MCH RBC QN AUTO: 25 PG (ref 27–31)
MCHC RBC AUTO-ENTMCNC: 30.4 G/DL (ref 32–36)
MCV RBC AUTO: 82.2 FL (ref 80–96)
MONOCYTES # BLD AUTO: 0.32 K/UL (ref 0–0.8)
MONOCYTES NFR BLD AUTO: 5.2 % (ref 2–6)
MPC BLD CALC-MCNC: 11.7 FL (ref 9.4–12.4)
NEUTROPHILS # BLD AUTO: 3.78 K/UL (ref 1.8–7.7)
NEUTROPHILS NFR BLD AUTO: 60.9 % (ref 53–65)
NONHDLC SERPL-MCNC: 87 MG/DL
NRBC # BLD AUTO: 0 X10E3/UL
NRBC, AUTO (.00): 0 %
PLATELET # BLD AUTO: 316 K/UL (ref 150–400)
RBC # BLD AUTO: 5.17 M/UL (ref 4.2–5.4)
TIBC SERPL-MCNC: 424 ΜG/DL (ref 250–450)
TRIGL SERPL-MCNC: 104 MG/DL (ref 35–150)
VLDLC SERPL-MCNC: 21 MG/DL
WBC # BLD AUTO: 6.21 K/UL (ref 4.5–11)

## 2023-08-31 PROCEDURE — 80061 LIPID PANEL: ICD-10-PCS | Mod: ,,, | Performed by: CLINICAL MEDICAL LABORATORY

## 2023-08-31 PROCEDURE — 4010F PR ACE/ARB THEARPY RXD/TAKEN: ICD-10-PCS | Mod: CPTII,,, | Performed by: NURSE PRACTITIONER

## 2023-08-31 PROCEDURE — 3078F DIAST BP <80 MM HG: CPT | Mod: CPTII,,, | Performed by: NURSE PRACTITIONER

## 2023-08-31 PROCEDURE — 99396 PREV VISIT EST AGE 40-64: CPT | Mod: ,,, | Performed by: NURSE PRACTITIONER

## 2023-08-31 PROCEDURE — 82728 FERRITIN: ICD-10-PCS | Mod: ,,, | Performed by: CLINICAL MEDICAL LABORATORY

## 2023-08-31 PROCEDURE — 83550 IRON AND TIBC: ICD-10-PCS | Mod: ,,, | Performed by: CLINICAL MEDICAL LABORATORY

## 2023-08-31 PROCEDURE — 82947 GLUCOSE, FASTING: ICD-10-PCS | Mod: ,,, | Performed by: CLINICAL MEDICAL LABORATORY

## 2023-08-31 PROCEDURE — 3008F PR BODY MASS INDEX (BMI) DOCUMENTED: ICD-10-PCS | Mod: CPTII,,, | Performed by: NURSE PRACTITIONER

## 2023-08-31 PROCEDURE — 36415 COLL VENOUS BLD VENIPUNCTURE: CPT | Performed by: NURSE PRACTITIONER

## 2023-08-31 PROCEDURE — 3074F SYST BP LT 130 MM HG: CPT | Mod: CPTII,,, | Performed by: NURSE PRACTITIONER

## 2023-08-31 PROCEDURE — 3074F PR MOST RECENT SYSTOLIC BLOOD PRESSURE < 130 MM HG: ICD-10-PCS | Mod: CPTII,,, | Performed by: NURSE PRACTITIONER

## 2023-08-31 PROCEDURE — 85025 COMPLETE CBC W/AUTO DIFF WBC: CPT | Mod: ,,, | Performed by: CLINICAL MEDICAL LABORATORY

## 2023-08-31 PROCEDURE — 83540 ASSAY OF IRON: CPT | Mod: ,,, | Performed by: CLINICAL MEDICAL LABORATORY

## 2023-08-31 PROCEDURE — 3008F BODY MASS INDEX DOCD: CPT | Mod: CPTII,,, | Performed by: NURSE PRACTITIONER

## 2023-08-31 PROCEDURE — 83540 IRON AND TIBC: ICD-10-PCS | Mod: ,,, | Performed by: CLINICAL MEDICAL LABORATORY

## 2023-08-31 PROCEDURE — 3078F PR MOST RECENT DIASTOLIC BLOOD PRESSURE < 80 MM HG: ICD-10-PCS | Mod: CPTII,,, | Performed by: NURSE PRACTITIONER

## 2023-08-31 PROCEDURE — 99396 PR PREVENTIVE VISIT,EST,40-64: ICD-10-PCS | Mod: ,,, | Performed by: NURSE PRACTITIONER

## 2023-08-31 PROCEDURE — 82947 ASSAY GLUCOSE BLOOD QUANT: CPT | Mod: ,,, | Performed by: CLINICAL MEDICAL LABORATORY

## 2023-08-31 PROCEDURE — 82728 ASSAY OF FERRITIN: CPT | Mod: ,,, | Performed by: CLINICAL MEDICAL LABORATORY

## 2023-08-31 PROCEDURE — 85025 CBC WITH DIFFERENTIAL: ICD-10-PCS | Mod: ,,, | Performed by: CLINICAL MEDICAL LABORATORY

## 2023-08-31 PROCEDURE — 4010F ACE/ARB THERAPY RXD/TAKEN: CPT | Mod: CPTII,,, | Performed by: NURSE PRACTITIONER

## 2023-08-31 PROCEDURE — 83550 IRON BINDING TEST: CPT | Mod: ,,, | Performed by: CLINICAL MEDICAL LABORATORY

## 2023-08-31 PROCEDURE — 80061 LIPID PANEL: CPT | Mod: ,,, | Performed by: CLINICAL MEDICAL LABORATORY

## 2023-08-31 NOTE — PATIENT INSTRUCTIONS
Recommend physical activity at least 5 out of 7 days per week. Walking is the best physical exercise recommended with a goal of 7500 to 94298 steps per day. If lift weights, follow safety measures with having  if using heavy weights and use of weight belt to protect your back and maintain good body mechanics to avoid injury  Maintain healthy blood pressure with goal 130/80 or less  Also reduce salt in diet to maintain healthy blood pressure and reduce swelling  Goal of fluid intake is 1/2 body weight in ounces of water per day  Avoid high fat high carbohydrate diet: to do this avoid high intake of white foods such as potatoes, rice, etc.  If over the age of 50, recommend colonoscopy or Cologuard if appropriate  For Females: Monthly self- breast exams recommended 7-10 days after first day of menses (if no menses, day of your birthday each month is appropriate)  Yearly mammogram is strongly recommended over the age of 40 or sooner if first degree relative has been diagnosed with breast cancer

## 2023-08-31 NOTE — LETTER
August 31, 2023    Dimple Reinoso  [address] [place] [address]             [facility]  Family Medicine  [address] [facility]  [place] [address]  Phone: [phone]  Fax: [phone]   August 31, 2023     Patient: Dimple Reinoso   YOB: 1985   Date of Visit: 8/31/2023       To Whom it May Concern:    Dimple Reinoso was seen in my clinic on 8/31/2023. She may return to work on 09/01/2023 or sooner .    Please excuse her from any classes or work missed.    If you have any questions or concerns, please don't hesitate to call.    Sincerely,         Marina Mae, [place]

## 2023-09-01 NOTE — PROGRESS NOTES
MAGNUS Sandoval    [address] [place] [address]     PATIENT NAME: Dimple Reinoso  : 1985  DATE: 23  MRN: [id number]      Billing Provider: MAGNUS Sandoval  Level of Service: [place] PREVENTIVE VISIT,EST,40-64    ASSESSMENT AND PLAN:      Problem List Items Addressed This Visit    None  Visit Diagnoses       Annual physical exam    -  Primary    Screening mammogram for breast cancer        Relevant Orders    Mammo Digital Screening Bilat    Encounter for lipid screening for cardiovascular disease        Relevant Orders    Lipid Panel    Screening for diabetes mellitus (DM)        Relevant Orders    Glucose, Fasting    Iron deficiency anemia, unspecified iron deficiency anemia type        Relevant Orders    CBC Auto Differential (Completed)    Iron and TIBC    Ferritin        Recommend physical activity at least 5 out of 7 days per week. Walking is the best physical exercise recommended with a goal of 7500 to 70842 steps per day. If lift weights, follow safety measures with having  if using heavy weights and use of weight belt to protect your back and maintain good body mechanics to avoid injury  Maintain healthy blood pressure with goal 130/80 or less  Also reduce salt in diet to maintain healthy blood pressure and reduce swelling  Goal of fluid intake is 1/2 body weight in ounces of water per day  Avoid high fat high carbohydrate diet: to do this avoid high intake of white foods such as potatoes, rice, etc.  If over the age of 50, recommend colonoscopy or Cologuard if appropriate  For Females: Monthly self- breast exams recommended 7-10 days after first day of menses (if no menses, day of your birthday each month is appropriate)  Yearly mammogram is strongly recommended over the age of 40 or sooner if first degree relative has been diagnosed with breast cancer    Resume use of Mucinex D PRN congestion and sinus issues  Consider local honey 2  "teaspoons daily to help reduce allergies--- be sure to change honey with seasons (light sweet honey is spring/summer and darker bitter honey is fall/winter)  Pillows and blankets not washed in washing machine, place in dryer and run on hot setting for 10 minutes once a week to reduce allergens on bed linens.   Also dust ceiling fans weekly or any other fan in bedroom  Vacuum or sweep and mop bedroom floor every 3-4 days to help reduce allergens      Health Maintenance Topics with due status: Not Due       Topic Last Completion Date    Influenza Vaccine 04/20/2023    Cervical Cancer Screening 07/19/2023     Future Appointments   Date Time Provider Department Center   10/31/2023  8:00 AM Marina Mae, [facility]   1/2/2024  2:00 PM [facility]      No follow-ups on file. or sooner as needed.      CHIEF COMPLAINT: Annual Exam (Patient is here for her wellness visits for [facility]. If patient needs a prescription today it will need to go to [facility]. ) and Sinus Problem (Patient reports still reports having sinus issues. She states "this is happening every month for about 4 days. It will get better then comes back the next month.")           HISTORY OF PRESENT ILLNESS:  Dimple Reinoso is a 38 y.o. female who presents to the clinic today     Dimple Reinoso presents to the clinic with Annual Exam (Patient is here for her wellness visits for [facility]. If patient needs a prescription today it will need to go to [facility]. ) and Sinus Problem (Patient reports still reports having sinus issues. She states "this is happening every month for about 4 days. It will get better then comes back the next month.")     PAP-UTD  Dentist- UTD  Eye exam- UTD  Doing better with blood pressure and reports able to complete day without fatigue  Also recurrent sinus congestion with sneezing, improvement with mucinex D PRN use  Here for wellness visit " today  Also will evaluate anemia with previous history          PAST MEDICAL HISTORY:      Past Medical History:   Diagnosis Date    Abnormal electrocardiogram (ECG) (EKG) 2021    Abnormal ekg, cannot rule out old ant MI, await results of stress imaging to eval for ischemic substrate    Acute bacterial pharyngitis 2023    Anemia     Bilateral lower extremity edema     Elevated BP without diagnosis of hypertension 2022    Hypertension     Morbid obesity     Sinus headache 2022    Viral pharyngitis 2023     PAST SURGICAL HISTORY:  Past Surgical History:   Procedure Laterality Date     SECTION  2017    CHOLECYSTECTOMY  2004    REDUCTION OF BOTH BREASTS Bilateral 2006     SOCIAL HISTORY:  Social History     Socioeconomic History    Marital status:      Spouse name: Kem    Number of children: 3   Tobacco Use    Smoking status: Never     Passive exposure: Never    Smokeless tobacco: Never   Substance and Sexual Activity    Alcohol use: Yes     Comment: glass of wine maybe twice a month     Drug use: Never    Sexual activity: Yes     Partners: Male     Birth control/protection: None     FAMILY HISTORY:       Family History   Problem Relation Age of Onset    Hypertension Father     Breast cancer Maternal Grandmother     Diabetes Maternal Grandmother     No Known Problems Maternal Grandfather     Cancer Paternal Grandfather        ALLERGIES AND MEDICATIONS: updated and reviewed.       Review of patient's allergies indicates:  No Known Allergies  Medication List with Changes/Refills   Current Medications    HYDROCHLOROTHIAZIDE (HYDRODIURIL) 25 MG TABLET    Take 1 tablet (25 mg total) by mouth once daily.    LOSARTAN (COZAAR) 25 MG TABLET    Take 1 tablet (25 mg total) by mouth once daily.       SCREENING HISTORY:     Health Maintenance         Date Due Completion Date    Hepatitis C Screening Never done ---    COVID-19 Vaccine (1) Never done ---    HIV Screening Never done ---     "TETANUS VACCINE Never done ---    Mammogram Never done ---    Hemoglobin A1c (Diabetic Prevention Screening) Never done ---    Influenza Vaccine (1) 09/01/2023 4/20/2023 (Declined)    Override on 4/20/2023: Declined    Cervical Cancer Screening 07/19/2028 7/19/2023            REVIEW OF SYSTEMS:   Review of Systems   Constitutional: Negative.    HENT:  Positive for sinus pressure/congestion and sneezing.    Respiratory: Negative.     Cardiovascular: Negative.    Gastrointestinal: Negative.    Genitourinary: Negative.          PHYSICAL EXAM:         /79 (BP Location: Right arm, Patient Position: Sitting)   Pulse 72   Temp 98.6 °F (37 °C) (Oral)   Resp 16   Ht 5' 6" (1.676 m)   Wt (!) 139.3 kg (307 lb)   SpO2 100%   BMI 49.55 kg/m²  No LMP recorded.  Wt Readings from Last 3 Encounters:   08/31/23 (!) 139.3 kg (307 lb)   08/01/23 (!) 141 kg (310 lb 12.8 oz)   07/19/23 (!) 141.1 kg (311 lb)     BP Readings from Last 3 Encounters:   08/31/23 115/79   08/01/23 (!) 148/82   07/19/23 138/80     Estimated body mass index is 49.55 kg/m² as calculated from the following:    Height as of this encounter: 5' 6" (1.676 m).    Weight as of this encounter: 139.3 kg (307 lb).     Physical Exam  Constitutional:       Appearance: She is obese.   HENT:      Head: Normocephalic.      Right Ear: Tympanic membrane normal.      Left Ear: Tympanic membrane normal.      Nose:      Comments: Chronic hypertropic pale turbinates-- recommend stop use of Afrin     Mouth/Throat:      Mouth: Mucous membranes are moist.   Eyes:      Pupils: Pupils are equal, round, and reactive to light.   Cardiovascular:      Rate and Rhythm: Normal rate and regular rhythm.      Pulses: Normal pulses.      Heart sounds: Normal heart sounds.   Pulmonary:      Effort: Pulmonary effort is normal.      Breath sounds: Normal breath sounds.   Abdominal:      Palpations: Abdomen is soft.   Musculoskeletal:         General: Normal range of motion.      Cervical " back: Normal range of motion and neck supple.   Skin:     General: Skin is warm and dry.      Capillary Refill: Capillary refill takes 2 to 3 seconds.   Neurological:      Mental Status: She is alert and oriented to person, place, and time.   Psychiatric:         Mood and Affect: Mood normal.         Behavior: Behavior normal.         LABS:     I have reviewed old labs below:  Lab Results   Component Value Date    WBC 6.21 08/31/2023    HGB 12.9 08/31/2023    HCT 42.5 08/31/2023    MCV 82.2 08/31/2023     08/31/2023     05/05/2021    K 3.8 05/05/2021     05/05/2021    CALCIUM 8.6 05/05/2021    CO2 27 05/05/2021    GLU 75 05/05/2021    BUN 15 05/05/2021    CREATININE 0.72 05/05/2021    ANIONGAP 11 05/05/2021    ESTGFRAFRICA 117 05/05/2021    PROT 7.6 05/05/2021    ALBUMIN 3.6 05/05/2021    BILITOT 0.4 05/05/2021    ALKPHOS 104 (H) 05/05/2021    ALT 18 05/05/2021    AST 9 (L) 05/05/2021    CHOL 126 05/05/2021    TRIG 115 05/05/2021    HDL 43 05/05/2021    LDLCALC 60 05/05/2021    TSH 2.150 05/05/2021           Signature:  MAGNUS Sandoval  [address] [place] [address]  Phone #: [phone]  Fax #: [phone]       Date of encounter: 8/31/23    Patient Instructions   Recommend physical activity at least 5 out of 7 days per week. Walking is the best physical exercise recommended with a goal of 7500 to 34733 steps per day. If lift weights, follow safety measures with having  if using heavy weights and use of weight belt to protect your back and maintain good body mechanics to avoid injury  Maintain healthy blood pressure with goal 130/80 or less  Also reduce salt in diet to maintain healthy blood pressure and reduce swelling  Goal of fluid intake is 1/2 body weight in ounces of water per day  Avoid high fat high carbohydrate diet: to do this avoid high intake of white foods such as potatoes, rice, etc.  If over the age of 50, recommend  colonoscopy or Cologuard if appropriate  For Females: Monthly self- breast exams recommended 7-10 days after first day of menses (if no menses, day of your birthday each month is appropriate)  Yearly mammogram is strongly recommended over the age of 40 or sooner if first degree relative has been diagnosed with breast cancer

## 2023-09-09 ENCOUNTER — PATIENT MESSAGE (OUTPATIENT)
Dept: FAMILY MEDICINE | Facility: CLINIC | Age: 38
End: 2023-09-09
Payer: COMMERCIAL

## 2023-09-09 RX ORDER — PROMETHAZINE HYDROCHLORIDE AND DEXTROMETHORPHAN HYDROBROMIDE 6.25; 15 MG/5ML; MG/5ML
5 SYRUP ORAL EVERY 4 HOURS PRN
Qty: 120 ML | Refills: 0 | Status: SHIPPED | OUTPATIENT
Start: 2023-09-09 | End: 2024-04-03

## 2023-09-15 ENCOUNTER — PATIENT MESSAGE (OUTPATIENT)
Dept: FAMILY MEDICINE | Facility: CLINIC | Age: 38
End: 2023-09-15
Payer: COMMERCIAL

## 2023-09-15 DIAGNOSIS — J02.9 REFLUX PHARYNGITIS: Primary | ICD-10-CM

## 2023-09-15 RX ORDER — OMEPRAZOLE 40 MG/1
40 CAPSULE, DELAYED RELEASE ORAL DAILY
Qty: 90 CAPSULE | Refills: 1 | Status: SHIPPED | OUTPATIENT
Start: 2023-09-15 | End: 2024-04-03

## 2023-09-30 DIAGNOSIS — I10 ESSENTIAL HYPERTENSION: Chronic | ICD-10-CM

## 2023-10-02 RX ORDER — LOSARTAN POTASSIUM 25 MG/1
25 TABLET ORAL
Qty: 90 TABLET | Refills: 0 | Status: SHIPPED | OUTPATIENT
Start: 2023-10-02 | End: 2024-04-03 | Stop reason: SDUPTHER

## 2024-01-02 ENCOUNTER — HOSPITAL ENCOUNTER (OUTPATIENT)
Dept: RADIOLOGY | Facility: HOSPITAL | Age: 39
Discharge: HOME | End: 2024-01-02
Attending: NURSE PRACTITIONER
Payer: COMMERCIAL

## 2024-01-02 ENCOUNTER — PATIENT MESSAGE (OUTPATIENT)
Dept: FAMILY MEDICINE | Facility: CLINIC | Age: 39
End: 2024-01-02
Payer: COMMERCIAL

## 2024-01-02 VITALS — BODY MASS INDEX: 47.09 KG/M2 | HEIGHT: 66 IN | WEIGHT: 293 LBS

## 2024-01-02 DIAGNOSIS — J01.40 SUBACUTE PANSINUSITIS: ICD-10-CM

## 2024-01-02 DIAGNOSIS — Z12.31 SCREENING MAMMOGRAM FOR BREAST CANCER: ICD-10-CM

## 2024-01-02 DIAGNOSIS — J01.40 SUBACUTE PANSINUSITIS: Primary | ICD-10-CM

## 2024-01-02 PROCEDURE — 77067 SCR MAMMO BI INCL CAD: CPT | Mod: TC

## 2024-01-02 RX ORDER — AZITHROMYCIN 250 MG/1
TABLET, FILM COATED ORAL
Qty: 6 TABLET | Refills: 0 | Status: SHIPPED | OUTPATIENT
Start: 2024-01-02 | End: 2024-01-07

## 2024-01-02 RX ORDER — AZITHROMYCIN 250 MG/1
TABLET, FILM COATED ORAL
Qty: 6 EACH | Refills: 0 | Status: SHIPPED | OUTPATIENT
Start: 2024-01-02 | End: 2024-01-02 | Stop reason: SDUPTHER

## 2024-01-08 ENCOUNTER — HOSPITAL ENCOUNTER (OUTPATIENT)
Dept: RADIOLOGY | Facility: HOSPITAL | Age: 39
Discharge: HOME | End: 2024-01-08
Attending: RADIOLOGY
Payer: COMMERCIAL

## 2024-01-08 DIAGNOSIS — R92.8 ABNORMAL MAMMOGRAM: ICD-10-CM

## 2024-01-08 PROCEDURE — 77065 DX MAMMO INCL CAD UNI: CPT | Mod: TC,RT

## 2024-01-08 PROCEDURE — 76642 ULTRASOUND BREAST LIMITED: CPT | Mod: TC,RT

## 2024-01-08 PROCEDURE — 77061 BREAST TOMOSYNTHESIS UNI: CPT | Mod: TC,RT

## 2024-03-07 ENCOUNTER — PATIENT MESSAGE (OUTPATIENT)
Dept: FAMILY MEDICINE | Facility: CLINIC | Age: 39
End: 2024-03-07
Payer: COMMERCIAL

## 2024-04-03 ENCOUNTER — OFFICE VISIT (OUTPATIENT)
Dept: FAMILY MEDICINE | Facility: CLINIC | Age: 39
End: 2024-04-03
Payer: COMMERCIAL

## 2024-04-03 VITALS
SYSTOLIC BLOOD PRESSURE: 142 MMHG | RESPIRATION RATE: 18 BRPM | HEART RATE: 70 BPM | WEIGHT: 293 LBS | BODY MASS INDEX: 47.09 KG/M2 | OXYGEN SATURATION: 100 % | HEIGHT: 66 IN | TEMPERATURE: 97 F | DIASTOLIC BLOOD PRESSURE: 90 MMHG

## 2024-04-03 DIAGNOSIS — I10 ESSENTIAL HYPERTENSION: ICD-10-CM

## 2024-04-03 LAB
ALBUMIN SERPL BCP-MCNC: 3.8 G/DL (ref 3.5–5)
ALBUMIN/GLOB SERPL: 0.8 {RATIO}
ALP SERPL-CCNC: 129 U/L (ref 37–98)
ALT SERPL W P-5'-P-CCNC: 19 U/L (ref 13–56)
ANION GAP SERPL CALCULATED.3IONS-SCNC: 11 MMOL/L (ref 7–16)
AST SERPL W P-5'-P-CCNC: 15 U/L (ref 15–37)
BASOPHILS # BLD AUTO: 0.05 K/UL (ref 0–0.2)
BASOPHILS NFR BLD AUTO: 0.6 % (ref 0–1)
BILIRUB SERPL-MCNC: 0.4 MG/DL (ref ?–1.2)
BUN SERPL-MCNC: 12 MG/DL (ref 7–18)
BUN/CREAT SERPL: 14 (ref 6–20)
CALCIUM SERPL-MCNC: 9.6 MG/DL (ref 8.5–10.1)
CHLORIDE SERPL-SCNC: 106 MMOL/L (ref 98–107)
CHOLEST SERPL-MCNC: 145 MG/DL (ref 0–200)
CHOLEST/HDLC SERPL: 3.2 {RATIO}
CO2 SERPL-SCNC: 24 MMOL/L (ref 21–32)
CREAT SERPL-MCNC: 0.86 MG/DL (ref 0.55–1.02)
DIFFERENTIAL METHOD BLD: ABNORMAL
EGFR (NO RACE VARIABLE) (RUSH/TITUS): 88 ML/MIN/1.73M2
EOSINOPHIL # BLD AUTO: 0.28 K/UL (ref 0–0.5)
EOSINOPHIL NFR BLD AUTO: 3.2 % (ref 1–4)
ERYTHROCYTE [DISTWIDTH] IN BLOOD BY AUTOMATED COUNT: 16 % (ref 11.5–14.5)
GLOBULIN SER-MCNC: 4.6 G/DL (ref 2–4)
GLUCOSE SERPL-MCNC: 89 MG/DL (ref 74–106)
HCT VFR BLD AUTO: 43.6 % (ref 38–47)
HDLC SERPL-MCNC: 45 MG/DL (ref 40–60)
HGB BLD-MCNC: 12.9 G/DL (ref 12–16)
IMM GRANULOCYTES # BLD AUTO: 0.05 K/UL (ref 0–0.04)
IMM GRANULOCYTES NFR BLD: 0.6 % (ref 0–0.4)
LDLC SERPL CALC-MCNC: 80 MG/DL
LDLC/HDLC SERPL: 1.8 {RATIO}
LYMPHOCYTES # BLD AUTO: 2.58 K/UL (ref 1–4.8)
LYMPHOCYTES NFR BLD AUTO: 29.2 % (ref 27–41)
MCH RBC QN AUTO: 24.4 PG (ref 27–31)
MCHC RBC AUTO-ENTMCNC: 29.6 G/DL (ref 32–36)
MCV RBC AUTO: 82.6 FL (ref 80–96)
MONOCYTES # BLD AUTO: 0.45 K/UL (ref 0–0.8)
MONOCYTES NFR BLD AUTO: 5.1 % (ref 2–6)
MPC BLD CALC-MCNC: 11.4 FL (ref 9.4–12.4)
NEUTROPHILS # BLD AUTO: 5.44 K/UL (ref 1.8–7.7)
NEUTROPHILS NFR BLD AUTO: 61.3 % (ref 53–65)
NONHDLC SERPL-MCNC: 100 MG/DL
NRBC # BLD AUTO: 0 X10E3/UL
NRBC, AUTO (.00): 0 %
PLATELET # BLD AUTO: 283 K/UL (ref 150–400)
POTASSIUM SERPL-SCNC: 4 MMOL/L (ref 3.5–5.1)
PROT SERPL-MCNC: 8.4 G/DL (ref 6.4–8.2)
RBC # BLD AUTO: 5.28 M/UL (ref 4.2–5.4)
SODIUM SERPL-SCNC: 137 MMOL/L (ref 136–145)
TRIGL SERPL-MCNC: 100 MG/DL (ref 35–150)
VLDLC SERPL-MCNC: 20 MG/DL
WBC # BLD AUTO: 8.85 K/UL (ref 4.5–11)

## 2024-04-03 PROCEDURE — 3077F SYST BP >= 140 MM HG: CPT | Mod: CPTII,,, | Performed by: FAMILY MEDICINE

## 2024-04-03 PROCEDURE — 1159F MED LIST DOCD IN RCRD: CPT | Mod: CPTII,,, | Performed by: FAMILY MEDICINE

## 2024-04-03 PROCEDURE — 3080F DIAST BP >= 90 MM HG: CPT | Mod: CPTII,,, | Performed by: FAMILY MEDICINE

## 2024-04-03 PROCEDURE — 85025 COMPLETE CBC W/AUTO DIFF WBC: CPT | Mod: ,,, | Performed by: CLINICAL MEDICAL LABORATORY

## 2024-04-03 PROCEDURE — 1160F RVW MEDS BY RX/DR IN RCRD: CPT | Mod: CPTII,,, | Performed by: FAMILY MEDICINE

## 2024-04-03 PROCEDURE — 3008F BODY MASS INDEX DOCD: CPT | Mod: CPTII,,, | Performed by: FAMILY MEDICINE

## 2024-04-03 PROCEDURE — 99214 OFFICE O/P EST MOD 30 MIN: CPT | Mod: ,,, | Performed by: FAMILY MEDICINE

## 2024-04-03 PROCEDURE — 80061 LIPID PANEL: CPT | Mod: ,,, | Performed by: CLINICAL MEDICAL LABORATORY

## 2024-04-03 PROCEDURE — 80053 COMPREHEN METABOLIC PANEL: CPT | Mod: ,,, | Performed by: CLINICAL MEDICAL LABORATORY

## 2024-04-03 PROCEDURE — 4010F ACE/ARB THERAPY RXD/TAKEN: CPT | Mod: CPTII,,, | Performed by: FAMILY MEDICINE

## 2024-04-03 RX ORDER — LOSARTAN POTASSIUM 25 MG/1
25 TABLET ORAL DAILY
Qty: 90 TABLET | Refills: 1 | Status: SHIPPED | OUTPATIENT
Start: 2024-04-03

## 2024-04-03 RX ORDER — HYDROCHLOROTHIAZIDE 25 MG/1
25 TABLET ORAL DAILY
Qty: 90 TABLET | Refills: 1 | Status: SHIPPED | OUTPATIENT
Start: 2024-04-03 | End: 2024-09-30

## 2024-04-03 NOTE — PROGRESS NOTES
"New Clinic Note    Dimple Reinoso is a 38 y.o. female     CC:   Chief Complaint   Patient presents with    Annual Exam     Patient stated she is here for general health evaluation, renewal of medication sent to [facility], and is "fasting" for labs. Has a jose angel size lump in left breast she wants to discuss.     Hypertension    Follow-up    Medication Refill        Subjective    History of Present Illness HPI   Patient is for evaluation of chronic medical problems. Patient needs refills. Dimple  is tolerating medications well without side effects.  Patient did not take blood pressure medication this morning.   Patient complains of discoloration area to her left breast for 3 weeks. Patient had a breast US an mammogram on her right breast. This was believed to be benign. She is scheduled to have it repeated in July. Her left breast was read as benign.    Current Outpatient Medications:     hydroCHLOROthiazide (HYDRODIURIL) 25 MG tablet, Take 1 tablet (25 mg total) by mouth once daily., Disp: 90 tablet, Rfl: 1    losartan (COZAAR) 25 MG tablet, Take 1 tablet (25 mg total) by mouth once daily., Disp: 90 tablet, Rfl: 1     Past Medical History:   Diagnosis Date    Abnormal electrocardiogram (ECG) (EKG) 2021    Abnormal ekg, cannot rule out old ant MI, await results of stress imaging to eval for ischemic substrate    Acute bacterial pharyngitis 2023    Anemia     Bilateral lower extremity edema     Elevated BP without diagnosis of hypertension 2022    Hypertension     Morbid obesity     Sinus headache 2022    Viral pharyngitis 2023        Family History   Problem Relation Name Age of Onset    Hypertension Father      Breast cancer Maternal Grandmother      Diabetes Maternal Grandmother      No Known Problems Maternal Grandfather      Cancer Paternal Grandfather          Past Surgical History:   Procedure Laterality Date     SECTION  2017    CHOLECYSTECTOMY      REDUCTION OF " "BOTH BREASTS Bilateral 2006    TOTAL REDUCTION MAMMOPLASTY          Social History     Socioeconomic History    Marital status:      Spouse name: Kem    Number of children: 3   Tobacco Use    Smoking status: Never     Passive exposure: Never    Smokeless tobacco: Never   Substance and Sexual Activity    Alcohol use: Yes     Comment: glass of wine maybe twice a month     Drug use: Never    Sexual activity: Yes     Partners: Male     Birth control/protection: None        Review of Systems   Constitutional:  Negative for fatigue and fever.   HENT:  Negative for ear pain, postnasal drip, rhinorrhea and sinus pressure/congestion.    Respiratory:  Negative for cough and shortness of breath.    Cardiovascular:  Negative for chest pain.   Gastrointestinal:  Negative for abdominal pain, diarrhea, nausea and vomiting.   Genitourinary:  Negative for dysuria.   Neurological:  Negative for headaches.        BP (!) 142/90 (BP Location: Right arm, Patient Position: Sitting, BP Method: Large (Automatic))   Pulse 70   Temp 97 °F (36.1 °C) (Oral)   Resp 18   Ht 5' 6" (1.676 m)   Wt 136.1 kg (300 lb)   LMP 03/30/2024   SpO2 100%   BMI 48.42 kg/m²      Physical Exam  HENT:      Head: Normocephalic and atraumatic.      Mouth/Throat:      Pharynx: Oropharynx is clear.   Cardiovascular:      Rate and Rhythm: Normal rate and regular rhythm.   Pulmonary:      Effort: Pulmonary effort is normal.      Breath sounds: Normal breath sounds.   Chest:      Comments: 1 cm bruise noted at the 7 o'clock position of left breast. No masses felt. .   Neurological:      Mental Status: She is alert and oriented to person, place, and time.   Psychiatric:         Mood and Affect: Mood normal.         Behavior: Behavior normal.          Assessment and Plan      ICD-10-CM ICD-9-CM   1. Essential hypertension  I10 401.9        1. Essential hypertension  Not controlled today. She did not take her medications. Take medications as prescribed. " Will have nurse call to recheck in 2 weeks. Will watch breast bruise for now.   Overview:  Blood pressure not clearly controlled, will monitor and record bp AM and PM for review at next office visit    Orders:  -     hydroCHLOROthiazide (HYDRODIURIL) 25 MG tablet; Take 1 tablet (25 mg total) by mouth once daily.  Dispense: 90 tablet; Refill: 1  -     losartan (COZAAR) 25 MG tablet; Take 1 tablet (25 mg total) by mouth once daily.  Dispense: 90 tablet; Refill: 1  -     Comprehensive Metabolic Panel; Future; Expected date: 04/03/2024  -     CBC Auto Differential; Future; Expected date: 04/03/2024  -     Lipid Panel; Future; Expected date: 04/03/2024         Follow up in about 6 months (around 10/3/2024).     This information was created by a scribe under my supervision and in my presence. I have reviewed and agree with the scribe's documentation.

## 2024-04-17 ENCOUNTER — TELEPHONE (OUTPATIENT)
Dept: FAMILY MEDICINE | Facility: CLINIC | Age: 39
End: 2024-04-17
Payer: COMMERCIAL

## 2024-04-17 NOTE — TELEPHONE ENCOUNTER
Tried to call patient to follow up on blood pressure. No answer. Left voicemail for patient to return call to clinic.

## 2024-04-18 ENCOUNTER — TELEPHONE (OUTPATIENT)
Dept: FAMILY MEDICINE | Facility: CLINIC | Age: 39
End: 2024-04-18
Payer: COMMERCIAL

## 2024-04-18 NOTE — TELEPHONE ENCOUNTER
Tried to contact patient to follow up on blood pressure twice. Patient did not answer and voicemail was left for patient to return call to clinic.

## 2024-04-18 NOTE — TELEPHONE ENCOUNTER
----- Message from Isadora Gunderson MA sent at 4/3/2024  9:40 AM CDT -----  F/u on blood pressure

## 2024-07-08 ENCOUNTER — HOSPITAL ENCOUNTER (OUTPATIENT)
Dept: RADIOLOGY | Facility: HOSPITAL | Age: 39
Discharge: HOME | End: 2024-07-08
Attending: RADIOLOGY
Payer: COMMERCIAL

## 2024-07-08 DIAGNOSIS — R92.8 ABNORMAL MAMMOGRAM: ICD-10-CM

## 2024-07-08 PROCEDURE — 77065 DX MAMMO INCL CAD UNI: CPT | Mod: TC,RT

## 2024-07-08 PROCEDURE — 76642 ULTRASOUND BREAST LIMITED: CPT | Mod: TC,RT

## 2024-07-08 PROCEDURE — 77061 BREAST TOMOSYNTHESIS UNI: CPT | Mod: TC,RT

## 2024-09-13 DIAGNOSIS — N91.2 AMENORRHEA: Primary | ICD-10-CM

## 2024-09-16 ENCOUNTER — PROCEDURE VISIT (OUTPATIENT)
Dept: OBSTETRICS AND GYNECOLOGY | Facility: CLINIC | Age: 39
End: 2024-09-16
Payer: COMMERCIAL

## 2024-09-16 VITALS
OXYGEN SATURATION: 99 % | WEIGHT: 293 LBS | RESPIRATION RATE: 16 BRPM | HEIGHT: 66 IN | BODY MASS INDEX: 47.09 KG/M2 | HEART RATE: 82 BPM | SYSTOLIC BLOOD PRESSURE: 134 MMHG | DIASTOLIC BLOOD PRESSURE: 86 MMHG

## 2024-09-16 DIAGNOSIS — Z30.46 NEXPLANON REMOVAL: Primary | ICD-10-CM

## 2024-09-16 PROCEDURE — 11982 REMOVE DRUG IMPLANT DEVICE: CPT | Mod: PBBFAC | Performed by: OBSTETRICS & GYNECOLOGY

## 2024-09-16 NOTE — PROCEDURES
Removal of Nexplanon Device    Date/Time: 9/16/2024 3:00 PM    Performed by: Vignesh Ogden MD  Authorized by: Vignesh Ogden MD    Consent given by:  Patient  Procedure risks and benefits discussed: yes    Patient questions answered: yes    Patient agrees, verbalizes understanding, and wants to proceed: yes    Educational handouts given: no    Instructions and paperwork completed: yes    Implant grasped by: hemostat  Removal due to infection and inflammatory reaction: no    Removal due to mechanical complications: no    Removed with no complications: yes    Removal due to expiration: yes    Arm: left arm  Palpation confirms location: yes  Small stab incision was made in arm: yes  Upon removal device was intact: yes  Site was close with steri-strips and pressure bandage applied: yes  Pre-procedure timeout performed: yes  The site was cleaned  and prepped in a sterile fashion: yes  Specimen sent to pathology: Yes  Presented for Nexplanon removal.      She initially stated she would like Nexplanon replaced.  However upon presentation she stated she has changed her mind and would simply like Nexplanon removed.      Left upper arm was prepped with Betadine.  Nexplanon capsule identified subcutaneously.      Using sterile technique the area was prepped with Betadine block with lidocaine.  Small 5 mm incision was made with scalpel.  Using hemostat I was able to retrieve Nexplanon capsule.  It was removed.  Capsule was removed intact and shown to patient.      5 mm incision was reapproximated with Steri-Strips and Band-Aid.    Estimated blood loss less than 1 cc    Postsurgical incision care instructions given she will notify if any signs of infection.

## 2024-09-16 NOTE — PATIENT INSTRUCTIONS
Discussed Nexplanon removal.      She understands it is time for yearly exam.  She will schedule if desired.      She will continue yearly screening mammography    She has continue having light monthly menstrual flows using Nexplanon.    In addition she is using condoms.  Previous Pap and HPV testing July 20, 2023 were negative.  Trichomonas was noted and she was treated with Flagyl.      No vaginal discharge at present.    Previous mammography screening July 20, 2024 received normal letter

## 2024-10-11 DIAGNOSIS — I10 ESSENTIAL HYPERTENSION: Chronic | ICD-10-CM

## 2024-10-11 RX ORDER — HYDROCHLOROTHIAZIDE 25 MG/1
TABLET ORAL
Qty: 90 TABLET | Refills: 1 | OUTPATIENT
Start: 2024-10-11

## 2025-01-03 ENCOUNTER — HOSPITAL ENCOUNTER (OUTPATIENT)
Dept: RADIOLOGY | Facility: HOSPITAL | Age: 40
Discharge: HOME | End: 2025-01-03
Attending: NURSE PRACTITIONER
Payer: COMMERCIAL

## 2025-01-03 VITALS — WEIGHT: 293 LBS | BODY MASS INDEX: 48.42 KG/M2

## 2025-01-03 DIAGNOSIS — Z12.31 VISIT FOR SCREENING MAMMOGRAM: ICD-10-CM

## 2025-01-03 PROCEDURE — 77067 SCR MAMMO BI INCL CAD: CPT | Mod: TC
